# Patient Record
Sex: MALE | Race: WHITE | NOT HISPANIC OR LATINO | Employment: OTHER | URBAN - METROPOLITAN AREA
[De-identification: names, ages, dates, MRNs, and addresses within clinical notes are randomized per-mention and may not be internally consistent; named-entity substitution may affect disease eponyms.]

---

## 2017-01-18 ENCOUNTER — DOCUMENTATION ONLY (OUTPATIENT)
Dept: REHABILITATION | Facility: HOSPITAL | Age: 82
End: 2017-01-18

## 2017-01-18 NOTE — PROGRESS NOTES
Name: Arie Olson Brad  Referring Provider:  PT Order: PT evaluate and treat  Clinical #: [id number]  Discharge Summary Date: 1/18/2017  Diagnosis: pain of both hip joints    Patient was seen for 13 OP PT visits from 10/13/2016 to 12/19/2016. Pt missed/no visit 0 scheduled sessions. Treatment included: evaluation, HEP, pt education, aquatic therapy, joint mobilizations, ther ex, and stretching. PT unable to fully assess goal achievement as pt did not return for follow up sessions/did not reschedule follow up visits. This patient is discharged from OP PT Services.    Yoon Bravo DPT  1/18/2017

## 2017-01-30 DIAGNOSIS — I48.0 PAF (PAROXYSMAL ATRIAL FIBRILLATION): Primary | ICD-10-CM

## 2017-01-31 ENCOUNTER — HOSPITAL ENCOUNTER (OUTPATIENT)
Dept: CARDIOLOGY | Facility: CLINIC | Age: 82
Discharge: HOME | End: 2017-01-31
Payer: MEDICARE

## 2017-01-31 ENCOUNTER — OFFICE VISIT (OUTPATIENT)
Dept: ELECTROPHYSIOLOGY | Facility: CLINIC | Age: 82
End: 2017-01-31
Payer: MEDICARE

## 2017-01-31 VITALS
HEART RATE: 63 BPM | BODY MASS INDEX: 25.75 KG/M2 | HEIGHT: 70 IN | DIASTOLIC BLOOD PRESSURE: 59 MMHG | SYSTOLIC BLOOD PRESSURE: 117 MMHG | WEIGHT: 179.88 LBS

## 2017-01-31 DIAGNOSIS — I73.9 PAD (PERIPHERAL ARTERY DISEASE): ICD-10-CM

## 2017-01-31 DIAGNOSIS — I34.0 NON-RHEUMATIC MITRAL REGURGITATION: Chronic | ICD-10-CM

## 2017-01-31 DIAGNOSIS — I48.0 PAF (PAROXYSMAL ATRIAL FIBRILLATION): ICD-10-CM

## 2017-01-31 DIAGNOSIS — I49.3 PVC'S (PREMATURE VENTRICULAR CONTRACTIONS): Chronic | ICD-10-CM

## 2017-01-31 DIAGNOSIS — Z79.899 ON AMIODARONE THERAPY: ICD-10-CM

## 2017-01-31 DIAGNOSIS — I48.0 PAROXYSMAL ATRIAL FIBRILLATION: Primary | ICD-10-CM

## 2017-01-31 DIAGNOSIS — I10 ESSENTIAL HYPERTENSION: Chronic | ICD-10-CM

## 2017-01-31 PROCEDURE — 93010 ELECTROCARDIOGRAM REPORT: CPT | Mod: S$PBB,,, | Performed by: INTERNAL MEDICINE

## 2017-01-31 PROCEDURE — 99999 PR PBB SHADOW E&M-EST. PATIENT-LVL III: CPT | Mod: PBBFAC,,, | Performed by: INTERNAL MEDICINE

## 2017-01-31 PROCEDURE — 99214 OFFICE O/P EST MOD 30 MIN: CPT | Mod: S$PBB,,, | Performed by: INTERNAL MEDICINE

## 2017-01-31 PROCEDURE — 99213 OFFICE O/P EST LOW 20 MIN: CPT | Mod: PBBFAC | Performed by: INTERNAL MEDICINE

## 2017-01-31 NOTE — MR AVS SNAPSHOT
[facility]  [address]  [facility] [address]  Phone: [phone]  Fax: [phone]                  Arie Salinas   2017 2:20 PM   Office Visit    Description:  Male : 6/15/1927   Provider:  Terence Belle MD   Department:  [facility]           Reason for Visit     Atrial Fibrillation           Diagnoses this Visit        Comments    Paroxysmal atrial fibrillation    -  Primary     On amiodarone therapy         Essential hypertension         Non-rheumatic mitral regurgitation         PAD (peripheral artery disease)         PVC's (premature ventricular contractions)                To Do List           Goals (5 Years of Data)     None      Follow-Up and Disposition     Return in about 1 year (around 2018).      [facility] On Call     [facility] On Call Nurse Care Line -  Assistance  Registered nurses in the [facility] On Call Center provide clinical advisement, health education, appointment booking, and other advisory services.  Call for this free service at [phone].             Medications           Message regarding Medications     Verify the changes and/or additions to your medication regime listed below are the same as discussed with your clinician today.  If any of these changes or additions are incorrect, please notify your healthcare provider.             Verify that the below list of medications is an accurate representation of the medications you are currently taking.  If none reported, the list may be blank. If incorrect, please contact your healthcare provider. Carry this list with you in case of emergency.           Current Medications     allopurinol (ZYLOPRIM) 300 MG tablet Take 1 tablet (300 mg total) by mouth once daily.    amiodarone (PACERONE) 200 MG Tab Take 1 tablet (200 mg total) by mouth once daily.    apixaban 2.5 mg Tab Take 1 tablet (2.5 mg total) by mouth 2 (two) times daily.    aspirin 81 mg Tab Take 81 mg by mouth Daily.  "   atorvastatin (LIPITOR) 20 MG tablet Take 1 tablet (20 mg total) by mouth once daily.    lisinopril (PRINIVIL,ZESTRIL) 20 MG tablet Take 1 tablet (20 mg total) by mouth once daily.    metoprolol succinate (TOPROL-XL) 50 MG 24 hr tablet Take 1 tablet (50 mg total) by mouth once daily.    multivitamin-minerals-lutein (CENTRUM SILVER) Tab Take 1 tablet by mouth Daily.    tamsulosin (FLOMAX) 0.4 mg Cp24 Take 1 capsule (0.4 mg total) by mouth once daily.           Clinical Reference Information           Vital Signs - Last Recorded  Most recent update: 1/31/2017  2:14 PM by Debra Quintana MA    BP Pulse Ht Wt BMI    (!) 117/59 63 5' 10" (1.778 m) 81.6 kg (179 lb 14.3 oz) 25.81 kg/m2      Blood Pressure          Most Recent Value    BP  (!)  117/59      Allergies as of 1/31/2017     Iodine      Immunizations Administered on Date of Encounter - 1/31/2017     None      Orders Placed During Today's Visit     Future Labs/Procedures Expected by Expires    TSH  1/31/2017 4/1/2018    Complete PFT with bronchodilator  As directed 1/31/2018    PULSE OXIMETRY WITH REST - PULM  As directed 1/31/2018      "

## 2017-02-09 ENCOUNTER — HOSPITAL ENCOUNTER (OUTPATIENT)
Dept: PULMONOLOGY | Facility: CLINIC | Age: 82
Discharge: HOME | End: 2017-02-09
Payer: MEDICARE

## 2017-02-09 DIAGNOSIS — Z79.899 ON AMIODARONE THERAPY: ICD-10-CM

## 2017-02-09 LAB
PRE FEV1 FVC: 65
PRE FEV1: 2.04
PRE FVC: 3.16
PREDICTED FEV1 FVC: 77
PREDICTED FEV1: 2.22
PREDICTED FVC: 2.91

## 2017-02-09 PROCEDURE — 94010 BREATHING CAPACITY TEST: CPT | Mod: 26,S$PBB,, | Performed by: INTERNAL MEDICINE

## 2017-02-09 PROCEDURE — 94010 BREATHING CAPACITY TEST: CPT | Mod: PBBFAC | Performed by: INTERNAL MEDICINE

## 2017-02-09 PROCEDURE — 94729 DIFFUSING CAPACITY: CPT | Mod: PBBFAC | Performed by: INTERNAL MEDICINE

## 2017-02-09 PROCEDURE — 94729 DIFFUSING CAPACITY: CPT | Mod: 26,S$PBB,, | Performed by: INTERNAL MEDICINE

## 2017-02-10 ENCOUNTER — LAB VISIT (OUTPATIENT)
Dept: LAB | Facility: HOSPITAL | Age: 82
End: 2017-02-10
Attending: INTERNAL MEDICINE
Payer: MEDICARE

## 2017-02-10 DIAGNOSIS — Z79.899 ON AMIODARONE THERAPY: ICD-10-CM

## 2017-02-10 LAB — TSH SERPL DL<=0.005 MIU/L-ACNC: 0.98 UIU/ML

## 2017-02-10 PROCEDURE — 84443 ASSAY THYROID STIM HORMONE: CPT

## 2017-02-10 PROCEDURE — 36415 COLL VENOUS BLD VENIPUNCTURE: CPT | Mod: PO

## 2017-02-20 ENCOUNTER — TELEPHONE (OUTPATIENT)
Dept: GASTROENTEROLOGY | Facility: CLINIC | Age: 82
End: 2017-02-20

## 2017-02-20 NOTE — TELEPHONE ENCOUNTER
----- Message from Miguel Angel Jo MD sent at 2/20/2017  3:20 PM CST -----  Contact: [phone] -self   Either next Monday at 2 pm as new pt, or cancellation later  ----- Message -----     From: Faviola Reeves MA     Sent: 2/20/2017   2:49 PM       To: Miguel Angel Jo MD    Stated he has seen you in the past.  Offered him the 20th and he feels it is too far off.  Stated his son daniel blake, who is a MD, referred him.  Do you want to work him in sooner?  ----- Message -----     From: Socorro Opal     Sent: 2/20/2017   2:19 PM       To: Sandro Jo - wants appt - stomach is bothering him - please call patient at [phone]

## 2017-02-22 ENCOUNTER — HOSPITAL ENCOUNTER (EMERGENCY)
Facility: HOSPITAL | Age: 82
Discharge: HOME | End: 2017-02-22
Attending: EMERGENCY MEDICINE
Payer: MEDICARE

## 2017-02-22 VITALS
HEART RATE: 63 BPM | RESPIRATION RATE: 20 BRPM | WEIGHT: 165 LBS | DIASTOLIC BLOOD PRESSURE: 71 MMHG | OXYGEN SATURATION: 98 % | SYSTOLIC BLOOD PRESSURE: 156 MMHG | TEMPERATURE: 98 F | HEIGHT: 68 IN | BODY MASS INDEX: 25.01 KG/M2

## 2017-02-22 DIAGNOSIS — R07.9 CHEST PAIN, UNSPECIFIED TYPE: Primary | ICD-10-CM

## 2017-02-22 LAB
ALBUMIN SERPL BCP-MCNC: 3.3 G/DL
ALP SERPL-CCNC: 101 U/L
ALT SERPL W/O P-5'-P-CCNC: 24 U/L
ANION GAP SERPL CALC-SCNC: 9 MMOL/L
AST SERPL-CCNC: 24 U/L
BASOPHILS # BLD AUTO: 0.01 K/UL
BASOPHILS NFR BLD: 0.1 %
BILIRUB SERPL-MCNC: 0.6 MG/DL
BNP SERPL-MCNC: 503 PG/ML
BUN SERPL-MCNC: 18 MG/DL
CALCIUM SERPL-MCNC: 9.1 MG/DL
CHLORIDE SERPL-SCNC: 108 MMOL/L
CO2 SERPL-SCNC: 23 MMOL/L
CREAT SERPL-MCNC: 1.4 MG/DL
DIFFERENTIAL METHOD: ABNORMAL
EOSINOPHIL # BLD AUTO: 0.1 K/UL
EOSINOPHIL NFR BLD: 0.9 %
ERYTHROCYTE [DISTWIDTH] IN BLOOD BY AUTOMATED COUNT: 14.1 %
EST. GFR  (AFRICAN AMERICAN): 51.1 ML/MIN/1.73 M^2
EST. GFR  (NON AFRICAN AMERICAN): 44.2 ML/MIN/1.73 M^2
GLUCOSE SERPL-MCNC: 88 MG/DL
HCT VFR BLD AUTO: 38.5 %
HGB BLD-MCNC: 12.7 G/DL
INR PPP: 1
LYMPHOCYTES # BLD AUTO: 1.2 K/UL
LYMPHOCYTES NFR BLD: 13.6 %
MCH RBC QN AUTO: 30.5 PG
MCHC RBC AUTO-ENTMCNC: 33 %
MCV RBC AUTO: 93 FL
MONOCYTES # BLD AUTO: 0.9 K/UL
MONOCYTES NFR BLD: 10.7 %
NEUTROPHILS # BLD AUTO: 6.3 K/UL
NEUTROPHILS NFR BLD: 74.1 %
PLATELET # BLD AUTO: 203 K/UL
PMV BLD AUTO: 11.3 FL
POTASSIUM SERPL-SCNC: 4.7 MMOL/L
PROT SERPL-MCNC: 6.4 G/DL
PROTHROMBIN TIME: 11.1 SEC
RBC # BLD AUTO: 4.16 M/UL
SODIUM SERPL-SCNC: 140 MMOL/L
TROPONIN I SERPL DL<=0.01 NG/ML-MCNC: 0.01 NG/ML
WBC # BLD AUTO: 8.54 K/UL

## 2017-02-22 PROCEDURE — 85610 PROTHROMBIN TIME: CPT

## 2017-02-22 PROCEDURE — 93010 ELECTROCARDIOGRAM REPORT: CPT | Mod: ,,, | Performed by: INTERNAL MEDICINE

## 2017-02-22 PROCEDURE — 93005 ELECTROCARDIOGRAM TRACING: CPT

## 2017-02-22 PROCEDURE — 99285 EMERGENCY DEPT VISIT HI MDM: CPT | Mod: ,,, | Performed by: EMERGENCY MEDICINE

## 2017-02-22 PROCEDURE — 25000003 PHARM REV CODE 250: Performed by: EMERGENCY MEDICINE

## 2017-02-22 PROCEDURE — 80053 COMPREHEN METABOLIC PANEL: CPT

## 2017-02-22 PROCEDURE — 83880 ASSAY OF NATRIURETIC PEPTIDE: CPT

## 2017-02-22 PROCEDURE — 99284 EMERGENCY DEPT VISIT MOD MDM: CPT | Mod: 25

## 2017-02-22 PROCEDURE — 85025 COMPLETE CBC W/AUTO DIFF WBC: CPT

## 2017-02-22 PROCEDURE — 84484 ASSAY OF TROPONIN QUANT: CPT

## 2017-02-22 RX ORDER — ASPIRIN 325 MG
325 TABLET ORAL
Status: COMPLETED | OUTPATIENT
Start: 2017-02-22 | End: 2017-02-22

## 2017-02-22 RX ADMIN — ASPIRIN 325 MG ORAL TABLET 325 MG: 325 PILL ORAL at 04:02

## 2017-02-22 NOTE — DISCHARGE INSTRUCTIONS
Follow up with your PCP and cardiology.  Return to ED for chest pain, shortness of breath, lightheadedness, dizziness or any other concerns.    Uncertain Causes of Chest Pain    Chest pain can happen for a number of reasons. Sometimes the cause can't be determined. If your condition does not seem serious, and your pain does not appear to be coming from your heart, your healthcare provider may recommend watching it closely. Sometimes the signs of a serious problem take more time to appear. Many problems not related to your heart can cause chest pain.These include:  · Musculoskeletal. Costochondritis, an inflammation of the tissues around the ribs that can occur from trauma or overuse injuries  · Respiratory. Pneumonia, pneumothorax, or pneumonitis (inflammation of the lining of the chest and lungs)  · Gastrointestinal. Esophageal reflux, heartburn, or gallbladder disease  · Anxiety and panic disorders  · Nerve compression and neuritis  · Miscellaneous problems such as aortic aneurysm or pulmonary embolism (a blood clot in the lungs)  Home care  After your visit, follow these recommendations:  · Rest today and avoid strenuous activity.  · Take any prescribed medicine as directed.  · Be aware of any recurrent chest pain and notice any changes  Follow-up care  Follow up with your healthcare provider if you do not start to feel better within 24 hours, or as advised.  Call 911  Call 911 if any of these occur:  · A change in the type of pain: if it feels different, becomes more severe, lasts longer, or begins to spread into your shoulder, arm, neck, jaw or back  · Shortness of breath or increased pain with breathing  · Weakness, dizziness, or fainting  · Rapid heart beat  · Crushing sensation in your chest  When to seek medical advice  Call your healthcare provider right away if any of the following occur:  · Cough with dark colored sputum (phlegm) or blood  · Fever of 100.4ºF (38ºC) or higher, or as directed by your  healthcare provider  · Swelling, pain or redness in one leg  · Shortness of breath  Date Last Reviewed: 12/30/2015  © 6591-6370 [employer]. [address] [place] [address], [place] [address]. All rights reserved. This information is not intended as a substitute for professional medical care. Always follow your healthcare professional's instructions.

## 2017-02-22 NOTE — ED AVS SNAPSHOT
[facility]  [address]  [facility] [address]               Arie Salinas   2017  2:16 PM   ED    Description:  Male : 6/15/1927   Department:  [facility]           Your Care was Coordinated By:     Provider Role From To    Caterina Duvall MD Attending Provider 17 1419 --      Reason for Visit     Chest Pain           Diagnoses this Visit        Comments    Chest pain, unspecified type    -  Primary       ED Disposition     ED Disposition Condition Comment    Discharge             To Do List           Follow-up Information     Follow up with Collin Mendes Jr, MD. Go in 1 week.    Specialty:  Internal Medicine    Contact information:    [address]  [facility] [address]  [phone]          Follow up with Kerri Gonzalez MD. Schedule an appointment as soon as possible for a visit in 1 week.    Specialty:  Cardiology    Contact information:     [facility]  [address]TH FLOOR  [facility] [address]  [phone]        [facility] On Call     [facility] On Call Nurse Care Line -  Assistance  Registered nurses in the [facility] On Call Center provide clinical advisement, health education, appointment booking, and other advisory services.  Call for this free service at [phone].             Medications           Message regarding Medications     Verify the changes and/or additions to your medication regime listed below are the same as discussed with your clinician today.  If any of these changes or additions are incorrect, please notify your healthcare provider.        These medications were administered today        Dose Freq    aspirin tablet 325 mg 325 mg ED 1 Time    Sig: Take 1 tablet (325 mg total) by mouth ED 1 Time.    Class: Normal    Route: Oral           Verify that the below list of medications is an accurate representation of the medications you are currently taking.  If none reported, the list may be  "blank. If incorrect, please contact your healthcare provider. Carry this list with you in case of emergency.           Current Medications     allopurinol (ZYLOPRIM) 300 MG tablet Take 1 tablet (300 mg total) by mouth once daily.    amiodarone (PACERONE) 200 MG Tab Take 1 tablet (200 mg total) by mouth once daily.    apixaban 2.5 mg Tab Take 1 tablet (2.5 mg total) by mouth 2 (two) times daily.    aspirin 81 mg Tab Take 81 mg by mouth Daily.    atorvastatin (LIPITOR) 20 MG tablet Take 1 tablet (20 mg total) by mouth once daily.    lisinopril (PRINIVIL,ZESTRIL) 20 MG tablet Take 1 tablet (20 mg total) by mouth once daily.    metoprolol succinate (TOPROL-XL) 50 MG 24 hr tablet Take 1 tablet (50 mg total) by mouth once daily.    multivitamin-minerals-lutein (CENTRUM SILVER) Tab Take 1 tablet by mouth Daily.    tamsulosin (FLOMAX) 0.4 mg Cp24 Take 1 capsule (0.4 mg total) by mouth once daily.           Clinical Reference Information           Your Vitals Were     BP Pulse Temp Resp Height Weight    156/71 (BP Location: Right arm, Patient Position: Lying, BP Method: Automatic) 63 97.8 °F (36.6 °C) (Oral) 20 5' 8" (1.727 m) 74.8 kg (165 lb)    SpO2 BMI             98% 25.09 kg/m2         Allergies as of 2/22/2017        Reactions    Iodine     Other reaction(s): Unknown      Immunizations Administered on Date of Encounter - 2/22/2017     None      ED Micro, Lab, POCT     Start Ordered       Status Ordering Provider    02/22/17 1443 02/22/17 1505  CBC auto differential  STAT      Final result     02/22/17 1443 02/22/17 1505  Comprehensive metabolic panel  STAT      Final result     02/22/17 1443 02/22/17 1505  Protime-INR  STAT      Final result     02/22/17 1443 02/22/17 1505  Troponin I  Now then every 3 hours     Comments:  PLEASE REVIEW ORDER START TIME BEFORE MARKING SPECIMEN COLLECTED.   Start Status   02/22/17 1443 Final result   02/22/17 1743 Acknowledged       Acknowledged     02/22/17 1443 02/22/17 1505  B-Type " natriuretic peptide (BNP)  STAT      Final result       ED Imaging Orders     Start Ordered       Status Ordering Provider    02/22/17 1443 02/22/17 1505  X-Ray Chest PA And Lateral  1 time imaging      Final result         Discharge Instructions       Follow up with your PCP and cardiology.  Return to ED for chest pain, shortness of breath, lightheadedness, dizziness or any other concerns.    Uncertain Causes of Chest Pain    Chest pain can happen for a number of reasons. Sometimes the cause can't be determined. If your condition does not seem serious, and your pain does not appear to be coming from your heart, your healthcare provider may recommend watching it closely. Sometimes the signs of a serious problem take more time to appear. Many problems not related to your heart can cause chest pain.These include:  · Musculoskeletal. Costochondritis, an inflammation of the tissues around the ribs that can occur from trauma or overuse injuries  · Respiratory. Pneumonia, pneumothorax, or pneumonitis (inflammation of the lining of the chest and lungs)  · Gastrointestinal. Esophageal reflux, heartburn, or gallbladder disease  · Anxiety and panic disorders  · Nerve compression and neuritis  · Miscellaneous problems such as aortic aneurysm or pulmonary embolism (a blood clot in the lungs)  Home care  After your visit, follow these recommendations:  · Rest today and avoid strenuous activity.  · Take any prescribed medicine as directed.  · Be aware of any recurrent chest pain and notice any changes  Follow-up care  Follow up with your healthcare provider if you do not start to feel better within 24 hours, or as advised.  Call 911  Call 911 if any of these occur:  · A change in the type of pain: if it feels different, becomes more severe, lasts longer, or begins to spread into your shoulder, arm, neck, jaw or back  · Shortness of breath or increased pain with breathing  · Weakness, dizziness, or fainting  · Rapid heart  beat  · Crushing sensation in your chest  When to seek medical advice  Call your healthcare provider right away if any of the following occur:  · Cough with dark colored sputum (phlegm) or blood  · Fever of 100.4ºF (38ºC) or higher, or as directed by your healthcare provider  · Swelling, pain or redness in one leg  · Shortness of breath  Date Last Reviewed: 12/30/2015  © 1404-5842 [employer]. [address] [place] [address], [place] [address]. All rights reserved. This information is not intended as a substitute for professional medical care. Always follow your healthcare professional's instructions.          Your Scheduled Appointments     Feb 27, 2017  2:00 PM CST   New Patient with Miguel Angel Jo MD   [facility] - Gastroenterology ([facility] )    [address]  [place] [address] [phone]            Mar 21, 2017  1:00 PM CDT   Non-Fasting Lab with LAB, APPOINTMENT [place] [facility] ([facility])    [address] [facility] [address] [phone]            Mar 29, 2017  2:20 PM CDT   Established Patient Visit with Christelle Culp MD   [facility] - Nephrology ([facility] )    [address]  [place] [address] [phone]              Smoking Cessation     If you would like to quit smoking:   You may be eligible for free services if you are a [place] resident and started smoking cigarettes before September 1, 1988.  Call the Smoking Cessation Trust (SCT) toll free at (224) 582-5365 or (701) 048-0870.   Call 6-374-QUIT-NOW if you do not meet the above criteria.             [facility] complies with applicable Federal civil rights laws and does not discriminate on the basis of race, color, national origin, age, disability, or sex.        Language Assistance Services     ATTENTION: Language assistance services are available, free of charge. Please call [phone].      ATENCIÓN: Valeria echols  español, tiene a umanzor disposición servicios gratuitos de asistencia lingüística. Llame al [phone].     ANKIT Ý: N?u b?n nói Ti?ng Vi?t, có các d?ch v? h? tr? ngôn ng? mi?n phí dành cho b?n. G?i s? [phone].

## 2017-02-22 NOTE — ED NOTES
"Pt states his chest pain is "actually a little better".  Pt advised that he will be called as soon as possible.  "

## 2017-02-22 NOTE — PROVIDER PROGRESS NOTES - EMERGENCY DEPT.
Encounter Date: 2/22/2017    ED Physician Progress Notes         EKG - STEMI Decision  Initial Reading: No STEMI present.  Response: No Action Needed.

## 2017-02-22 NOTE — ED PROVIDER NOTES
"Encounter Date: 2/22/2017    SCRIBE #1 NOTE: I, Yazmin Marie, am scribing for, and in the presence of, Dr. Duvall.       History     Chief Complaint   Patient presents with    Chest Pain     Review of patient's allergies indicates:   Allergen Reactions    Iodine      Other reaction(s): Unknown     HPI Comments: Time seen by provider: 2:22 PM    This is a 89 y.o. male with hypertension, CAD, chronic diastolic heart failure, and atrial fibrillation who presents with complaint of intermittent "twitching" in his chest since this morning.  The patient indicates that the discomfort is not painful and that he was at baseline when he went to sleep last night.  He denies associated SOB, pain with inspiration, light-headedness, dizziness, nausea, and vomiting.   He reports associated left flank pain and lower right and left quadrant abdominal pain.  The patient indicates that he takes Eloquis daily and that he woke up at 6:00 AM but the twitching did not begin until approximately 8:00 AM this morning.    The history is provided by the patient.     Past Medical History:   Diagnosis Date    Allergy     Arthritis     Atrial fibrillation 12/4/2015    Basal cell carcinoma     Chronic diastolic heart failure     Colon polyp     Coronary artery disease     Elevated PSA     Hyperlipidemia     Hypertension     Moderate mitral regurgitation     Prostate cancer     PVD (peripheral vascular disease)     Squamous cell carcinoma      Past Medical History Pertinent Negatives:   Diagnosis Date Noted    AAA (abdominal aortic aneurysm) 05/13/2015    Acute coronary syndrome 05/13/2015    Asthma 05/13/2015    Atrial flutter 05/13/2015    Cardiomyopathy 05/13/2015    Carotid artery occlusion 05/13/2015    CHF (congestive heart failure) 05/13/2015    Clotting disorder 05/13/2015    Diabetes mellitus 05/13/2015    Heart block 05/13/2015    Heart murmur 05/13/2015    Melanoma 08/29/2013    Sleep apnea 05/13/2015    " 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    Stroke 05/13/2015    Supraventricular tachycardia 05/13/2015    Syncope and collapse 05/13/2015    Thyroid disease 05/13/2015    Valvular regurgitation 05/13/2015    Ventricular tachycardia 05/13/2015     Past Surgical History:   Procedure Laterality Date    COLON SURGERY      radiation      prostate cancer    S/P BILATERAL RENAL STENT  04/08/2004    PTCA    TONSILLECTOMY       Family History   Problem Relation Age of Onset    Cancer Mother     No Known Problems Father     No Known Problems Sister     No Known Problems Maternal Grandmother     No Known Problems Maternal Grandfather     No Known Problems Paternal Grandmother     No Known Problems Paternal Grandfather     No Known Problems Brother     No Known Problems Maternal Aunt     No Known Problems Maternal Uncle     No Known Problems Paternal Aunt     No Known Problems Paternal Uncle     Heart disease Neg Hx     Diabetes Neg Hx     Colon polyps Neg Hx     Melanoma Neg Hx     Eczema Neg Hx     Psoriasis Neg Hx     Anemia Neg Hx     Arrhythmia Neg Hx     Asthma Neg Hx     Clotting disorder Neg Hx     Fainting Neg Hx     Heart attack Neg Hx     Heart failure Neg Hx     Hyperlipidemia Neg Hx     Hypertension Neg Hx     Stroke Neg Hx     Atrial Septal Defect Neg Hx      Social History   Substance Use Topics    Smoking status: Former Smoker     Years: 30.00     Types: Cigars     Quit date: 1/1/1998    Smokeless tobacco: Never Used    Alcohol use 3.6 oz/week     1 Shots of liquor, 5 Standard drinks or equivalent per week      Comment: 1 a day     Review of Systems   Constitutional: Negative for fever.   HENT: Negative for sore throat.    Eyes: Negative for visual disturbance.   Respiratory: Negative for shortness of breath.         The patient denies pain on inspiration.   Cardiovascular:        The patient indicates chest twitching.   Gastrointestinal: Positive for abdominal  pain (Lower right and left quadrant abdominal pain.). Negative for nausea and vomiting.   Genitourinary: Positive for flank pain (Left flank pain.).   Musculoskeletal: Negative for back pain.   Skin: Negative for pallor.   Neurological: Negative for dizziness and light-headedness.       Physical Exam   Initial Vitals   BP Pulse Resp Temp SpO2   02/22/17 1127 02/22/17 1127 02/22/17 1127 02/22/17 1127 02/22/17 1127   156/119 63 20 98.9 °F (37.2 °C) 95 %     Physical Exam    Nursing note and vitals reviewed.  Constitutional: He appears well-developed and well-nourished. No distress.   HENT:   Head: Normocephalic and atraumatic.   Eyes: EOM are normal. Pupils are equal, round, and reactive to light.   Neck: Normal range of motion.   Cardiovascular:   Murmur (Diastolic) heard.  Pulmonary/Chest: Breath sounds normal. No respiratory distress. He has no wheezes. He has no rhonchi. He has no rales.   Abdominal: Soft. He exhibits no distension. There is no tenderness. There is no rebound and no guarding.   Musculoskeletal: He exhibits no edema.   Neurological: He is alert and oriented to person, place, and time. He has normal strength.         ED Course   Procedures  Labs Reviewed   CBC W/ AUTO DIFFERENTIAL - Abnormal; Notable for the following:        Result Value    RBC 4.16 (*)     Hemoglobin 12.7 (*)     Hematocrit 38.5 (*)     Gran% 74.1 (*)     Lymph% 13.6 (*)     All other components within normal limits    Narrative:     PLEASE REVIEW ORDER START TIME BEFORE MARKING SPECIMEN  COLLECTED.   COMPREHENSIVE METABOLIC PANEL - Abnormal; Notable for the following:     Albumin 3.3 (*)     eGFR if  51.1 (*)     eGFR if non  44.2 (*)     All other components within normal limits    Narrative:     PLEASE REVIEW ORDER START TIME BEFORE MARKING SPECIMEN  COLLECTED.   B-TYPE NATRIURETIC PEPTIDE - Abnormal; Notable for the following:      (*)     All other components within normal limits     Narrative:     PLEASE REVIEW ORDER START TIME BEFORE MARKING SPECIMEN  COLLECTED.   PROTIME-INR    Narrative:     PLEASE REVIEW ORDER START TIME BEFORE MARKING SPECIMEN  COLLECTED.   TROPONIN I    Narrative:     PLEASE REVIEW ORDER START TIME BEFORE MARKING SPECIMEN  COLLECTED.     EKG Readings: (Independently Interpreted)   Sinus rhythm 1st deg AV block, no acute ischemia       X-Rays:   Independently Interpreted Readings:   Chest X-Ray: Mild blunting Lt costophrenic angle otherwise unremarkable     Medical Decision Making:   History:   Old Medical Records: I decided to obtain old medical records.  Initial Assessment:   88 y/o M c/o 'twitching' in Lt chest since 8am this morning no other associated complaints  ulikely ACS but given age and history will send blood work and order cxr  Clinical Tests:   Medical Tests: Ordered and Reviewed            Scribe Attestation:   Scribe #1: I performed the above scribed service and the documentation accurately describes the services I performed. I attest to the accuracy of the note.    Attending Attestation:           Physician Attestation for Scribe:  Physician Attestation Statement for Scribe #1: I, Dr. Duvall, reviewed documentation, as scribed by Yazmin Marie in my presence, and it is both accurate and complete.                 ED Course     Clinical Impression:   The encounter diagnosis was Chest pain, unspecified type.    Disposition:   Disposition: Discharged  Condition: Stable       Caterina Duvall MD  03/24/17 1748

## 2017-02-22 NOTE — ED NOTES
Pt identifiers Arie Salinas were checked and are correct  LOC: The patient is awake, alert, aware of environment with an appropriate affect. Oriented x3, speaking appropriately  APPEARANCE: Pt resting comfortably, in no acute distress, pt is clean and well groomed, clothing properly fastened  SKIN: Skin warm, dry and intact, normal skin turgor, moist mucus membranes  RESPIRATORY: Airway is open and patent, respirations are spontaneous, even and unlabored, normal effort and rate Resp full and reg Breath sounds clear cathryn to all lung fields on auscultation  CARDIAC: Normal rate and rhythm, 1 + peripheral edema noted, capillary refill < 3 seconds, bilateral radial pulses 2+  ABDOMEN: Soft, nontender, nondistended. Bowel sounds present   NEUROLOGIC:  facial expression is symmetrical, patient moving all extremities spontaneously, normal sensation in all extremities when touched with a finger.  Follows all commands appropriately  MUSCULOSKELETAL: No obvious deformities.

## 2017-02-27 ENCOUNTER — OFFICE VISIT (OUTPATIENT)
Dept: GASTROENTEROLOGY | Facility: CLINIC | Age: 82
End: 2017-02-27
Payer: MEDICARE

## 2017-02-27 VITALS
HEIGHT: 69 IN | DIASTOLIC BLOOD PRESSURE: 63 MMHG | WEIGHT: 177 LBS | RESPIRATION RATE: 16 BRPM | SYSTOLIC BLOOD PRESSURE: 132 MMHG | HEART RATE: 61 BPM | BODY MASS INDEX: 26.22 KG/M2

## 2017-02-27 DIAGNOSIS — R14.0 ABDOMINAL DISTENSION: Primary | ICD-10-CM

## 2017-02-27 PROCEDURE — 99999 PR PBB SHADOW E&M-EST. PATIENT-LVL III: CPT | Mod: PBBFAC,,, | Performed by: INTERNAL MEDICINE

## 2017-02-27 PROCEDURE — 99205 OFFICE O/P NEW HI 60 MIN: CPT | Mod: S$PBB,,, | Performed by: INTERNAL MEDICINE

## 2017-02-27 PROCEDURE — 99213 OFFICE O/P EST LOW 20 MIN: CPT | Mod: PBBFAC | Performed by: INTERNAL MEDICINE

## 2017-02-27 NOTE — PROGRESS NOTES
Subjective:       Patient ID: Arie Salinas is a 89 y.o. male.    Chief Complaint: stomach tightness    HPI  Review of Systems   Constitutional: Positive for activity change. Negative for appetite change, chills, diaphoresis, fatigue, fever and unexpected weight change.   HENT: Negative for congestion, ear pain, mouth sores, rhinorrhea, sinus pressure, sneezing, sore throat, trouble swallowing and voice change.    Eyes: Negative for pain.   Respiratory: Positive for shortness of breath. Negative for cough.    Cardiovascular: Positive for chest pain. Negative for palpitations and leg swelling.   Genitourinary: Negative for difficulty urinating and flank pain.   Musculoskeletal: Positive for arthralgias, back pain and gait problem. Negative for joint swelling, myalgias and neck pain.   Skin: Negative for rash.   Neurological: Negative for dizziness, tremors, syncope, numbness and headaches.   Hematological: Negative for adenopathy. Does not bruise/bleed easily.   Psychiatric/Behavioral: Negative for agitation, behavioral problems, confusion, decreased concentration and dysphoric mood.       Objective:      Physical Exam   Constitutional: He is oriented to person, place, and time. He appears well-developed and well-nourished. No distress.   HENT:   Right Ear: External ear normal.   Left Ear: External ear normal.   Nose: Nose normal.   Mouth/Throat: Oropharynx is clear and moist. No oropharyngeal exudate.   Eyes: Conjunctivae are normal. Pupils are equal, round, and reactive to light. No scleral icterus.   Neck: Normal range of motion. Neck supple. No thyromegaly present.   Cardiovascular: Normal rate and intact distal pulses.  Exam reveals no gallop.    Murmur heard.  Pulmonary/Chest: Effort normal and breath sounds normal. He has no wheezes.   Abdominal: Soft. Normal appearance. He exhibits abdominal bruit and ascites. He exhibits no distension, no fluid wave and no mass. Bowel sounds are increased. There is  no hepatosplenomegaly. There is no tenderness. There is no rigidity, no rebound, no guarding and no CVA tenderness.   Genitourinary:   Genitourinary Comments: recent   Musculoskeletal: Normal range of motion. He exhibits no edema or tenderness.   Lymphadenopathy:     He has no cervical adenopathy.   Neurological: He is alert and oriented to person, place, and time. He has normal reflexes. No cranial nerve deficit.   Skin: Skin is warm. No rash noted. He is not diaphoretic.   Psychiatric: He has a normal mood and affect. His behavior is normal. Thought content normal.       Assessment:       1. Abdominal distension        Plan:         This is a new patient.  Mr. Salinas is here with his ____.  He is an 89-year-old   gentleman with multiple severe medical problems including atrial fibrillation,   mitral regurgitation, peripheral artery disease, coronary artery disease,   anticoagulation, renal insufficiency.  He comes in today because of abdominal   discomfort.  For several months, he has noticed a generalized discomfort and   distention of the abdomen.  This does not localize.  He notices it every day.    It tends to be noticed more in the later aspects of the day, it does not get   worse after eating.  It is an annoyance rather than a pain.  His appetite is   normal.  He denies any early satiety.  There is no heartburn or reflux.  His   bowel movements are normal.  He normally has one or two formed stools per day.    His GI history is remarkable only in that many years ago he had some type of   cecal lesion that Dr. Saroj Ribera did a ileocecal resection for a primary   anastomosis.  He does not know or was never told that was a cancer.  I did his   last colonoscopy in 2010, which showed a single 4 mm polyp and diverticulosis.    ASSESSMENT AND DECISION MAKING:  Abdominal distention, although there is no   pain, since this is a new symptom in an 89-year-old with multiple severe medical   problems, I think it  deserves an evaluation.  He does have an abdominal bruit   on exam, which could indicate mesenteric ischemia, but I cannot imagine that is   involved in his symptoms now.  On physical exam, it does seem like he may have   some ascites and ascites could be due to heart failure, but it also could   indicate something else serious like a malignancy.  So I feel like we need to   determine whether or not he does have ascites and if so probably due   Interventional Radiology to do a paracentesis.  Also, on exam, there seems to be   an increased in his bowel sounds and that is very nonspecific, but that could   indicate some sort of partial bowel obstruction.  He does have a significant   colon history, I did not wanted to repeat a colonoscopy on him because of his   advanced age, other medical problems, and increased risk from being on Eliquis.    However, if the CAT scan identifies any abnormality of the colon, then we may   have to take that increased risk.  Likewise, the same as for doing an EGD.  I   would prefer not to do that, but if the CAT scan identifies a problem with the   gastric walls being thick, and possibly indicating some sort of malignancy, then   we might have to do further workup.    RECOMMENDATION:  1.  Set up CT scan.  2.  Take from there.  I did notice he is anemic, but that is a stable anemia and   probably due to his chronic renal insufficiency as his iron studies are normal   and do not indicate iron deficiency.      BERNARD/IN  dd: 02/27/2017 14:37:05 (CST)  td: 02/28/2017 06:23:46 (CST)  Doc ID   #[id number]  Job ID #[id number]    CC:

## 2017-02-27 NOTE — MR AVS SNAPSHOT
[facility]  [address]  [place] [address]  Phone: [phone]  Fax: [phone]                  Arie Salinas   2017 2:00 PM   Office Visit    Description:  Male : 6/15/1927   Provider:  Miguel Angel Jo MD   Department:  [facility]           Reason for Visit     stomach tightness           Diagnoses this Visit        Comments    Abdominal distension    -  Primary            To Do List           Future Appointments        Provider Department Dept Phone    3/2/2017 11:00 AM LAB, [place] [facility] [phone]    3/11/2017 12:00 PM [facility] CT1 64- LIMIT [address] [facility] [phone]    3/21/2017 1:00 PM LAB, APPOINTMENT [place] [facility] [phone]    3/29/2017 2:20 PM Christelle Culp MD [facility] [phone]      Goals (5 Years of Data)     None      [facility]     [facility] Nurse Care Line -  Assistance  Registered nurses in the [facility] provide clinical advisement, health education, appointment booking, and other advisory services.  Call for this free service at [phone].             Medications           Message regarding Medications     Verify the changes and/or additions to your medication regime listed below are the same as discussed with your clinician today.  If any of these changes or additions are incorrect, please notify your healthcare provider.             Verify that the below list of medications is an accurate representation of the medications you are currently taking.  If none reported, the list may be blank. If incorrect, please contact your healthcare provider. Carry this list with you in case of emergency.           Current Medications     allopurinol (ZYLOPRIM) 300 MG tablet Take 1 tablet (300 mg total) by mouth once daily.    amiodarone (PACERONE) 200 MG Tab Take 1 tablet (200 mg total) by mouth once daily.     apixaban 2.5 mg Tab Take 1 tablet (2.5 mg total) by mouth 2 (two) times daily.    aspirin 81 mg Tab Take 81 mg by mouth Daily.    atorvastatin (LIPITOR) 20 MG tablet Take 1 tablet (20 mg total) by mouth once daily.    lisinopril (PRINIVIL,ZESTRIL) 20 MG tablet Take 1 tablet (20 mg total) by mouth once daily.    metoprolol succinate (TOPROL-XL) 50 MG 24 hr tablet Take 1 tablet (50 mg total) by mouth once daily.    multivitamin-minerals-lutein (CENTRUM SILVER) Tab Take 1 tablet by mouth Daily.    tamsulosin (FLOMAX) 0.4 mg Cp24 Take 1 capsule (0.4 mg total) by mouth once daily.           Clinical Reference Information           Your Vitals Were     BP                   132/63 (BP Location: Left arm, Patient Position: Sitting)           Blood Pressure          Most Recent Value    BP  132/63      Allergies as of 2/27/2017     Iodine      Immunizations Administered on Date of Encounter - 2/27/2017     None      Orders Placed During Today's Visit     Future Labs/Procedures Expected by Expires    Creatinine, serum  2/27/2017 4/28/2018    CT Abdomen Pelvis With Contrast  2/27/2017 2/27/2018      Language Assistance Services     ATTENTION: Language assistance services are available, free of charge. Please call [phone].      ATENCIÓN: Si kinza curtis, tiene a umanzor disposición servicios gratuitos de asistencia lingüística. Llame al [phone].     ANKIT Ý: N?u b?n nói Ti?ng Vi?t, có các d?ch v? h? tr? ngôn ng? mi?n phí dành cho b?n. G?i s? [phone].         [facility] complies with applicable Federal civil rights laws and does not discriminate on the basis of race, color, national origin, age, disability, or sex.

## 2017-03-01 ENCOUNTER — LAB VISIT (OUTPATIENT)
Dept: LAB | Facility: HOSPITAL | Age: 82
End: 2017-03-01
Attending: INTERNAL MEDICINE
Payer: MEDICARE

## 2017-03-01 DIAGNOSIS — R14.0 ABDOMINAL DISTENSION: ICD-10-CM

## 2017-03-01 LAB
CREAT SERPL-MCNC: 1.6 MG/DL
EST. GFR  (AFRICAN AMERICAN): 43.5 ML/MIN/1.73 M^2
EST. GFR  (NON AFRICAN AMERICAN): 37.6 ML/MIN/1.73 M^2

## 2017-03-01 PROCEDURE — 36415 COLL VENOUS BLD VENIPUNCTURE: CPT | Mod: PO

## 2017-03-01 PROCEDURE — 82565 ASSAY OF CREATININE: CPT

## 2017-03-03 ENCOUNTER — TELEPHONE (OUTPATIENT)
Dept: GASTROENTEROLOGY | Facility: CLINIC | Age: 82
End: 2017-03-03

## 2017-03-03 NOTE — TELEPHONE ENCOUNTER
Spoke with  regarding his Prednisone prep for his CT scheduled for 3/11/2017.   Aware he will have to take 50 mg of Prednisone 13 hours, 7 hours, and 1 hour prior to CT.  In addition, he will take Benadryl 50 mg 1 hour prior to CT.  Will mail prep instructions to patient.   expressed understanding.  Both Prednisone 50 mg, # 3, with sig and Benadryl 50 mg, # 1,with sig, were called in to [facility] at [phone].  Message was left on their recorded line.

## 2017-03-06 ENCOUNTER — OFFICE VISIT (OUTPATIENT)
Dept: CARDIOLOGY | Facility: CLINIC | Age: 82
End: 2017-03-06
Payer: MEDICARE

## 2017-03-06 VITALS
HEIGHT: 68 IN | BODY MASS INDEX: 27.13 KG/M2 | SYSTOLIC BLOOD PRESSURE: 119 MMHG | WEIGHT: 179 LBS | HEART RATE: 55 BPM | DIASTOLIC BLOOD PRESSURE: 57 MMHG

## 2017-03-06 DIAGNOSIS — E78.00 PURE HYPERCHOLESTEROLEMIA: Chronic | ICD-10-CM

## 2017-03-06 DIAGNOSIS — I73.9 PAD (PERIPHERAL ARTERY DISEASE): ICD-10-CM

## 2017-03-06 DIAGNOSIS — N18.30 ANEMIA OF CHRONIC RENAL FAILURE, STAGE 3 (MODERATE): Chronic | ICD-10-CM

## 2017-03-06 DIAGNOSIS — I25.10 CORONARY ARTERY DISEASE INVOLVING NATIVE CORONARY ARTERY OF NATIVE HEART WITHOUT ANGINA PECTORIS: Chronic | ICD-10-CM

## 2017-03-06 DIAGNOSIS — D63.1 ANEMIA OF CHRONIC RENAL FAILURE, STAGE 3 (MODERATE): Chronic | ICD-10-CM

## 2017-03-06 DIAGNOSIS — Z79.899 ON AMIODARONE THERAPY: ICD-10-CM

## 2017-03-06 DIAGNOSIS — I50.33 ACUTE ON CHRONIC DIASTOLIC HEART FAILURE: Primary | ICD-10-CM

## 2017-03-06 DIAGNOSIS — I34.0 NON-RHEUMATIC MITRAL REGURGITATION: Chronic | ICD-10-CM

## 2017-03-06 DIAGNOSIS — I49.3 PVC'S (PREMATURE VENTRICULAR CONTRACTIONS): Chronic | ICD-10-CM

## 2017-03-06 DIAGNOSIS — N18.30 CKD (CHRONIC KIDNEY DISEASE) STAGE 3, GFR 30-59 ML/MIN: Chronic | ICD-10-CM

## 2017-03-06 DIAGNOSIS — I10 ESSENTIAL HYPERTENSION: Chronic | ICD-10-CM

## 2017-03-06 DIAGNOSIS — I48.0 PAROXYSMAL ATRIAL FIBRILLATION: ICD-10-CM

## 2017-03-06 DIAGNOSIS — R09.89 RIGHT CAROTID BRUIT: ICD-10-CM

## 2017-03-06 DIAGNOSIS — I70.1 RENAL ARTERY STENOSIS: Chronic | ICD-10-CM

## 2017-03-06 PROCEDURE — 99213 OFFICE O/P EST LOW 20 MIN: CPT | Mod: PBBFAC,PO | Performed by: INTERNAL MEDICINE

## 2017-03-06 PROCEDURE — 99999 PR PBB SHADOW E&M-EST. PATIENT-LVL III: CPT | Mod: PBBFAC,,, | Performed by: INTERNAL MEDICINE

## 2017-03-06 PROCEDURE — 99214 OFFICE O/P EST MOD 30 MIN: CPT | Mod: S$PBB,,, | Performed by: INTERNAL MEDICINE

## 2017-03-06 RX ORDER — FUROSEMIDE 20 MG/1
20 TABLET ORAL DAILY
COMMUNITY
End: 2017-03-06 | Stop reason: SDUPTHER

## 2017-03-06 RX ORDER — FUROSEMIDE 20 MG/1
20 TABLET ORAL DAILY
Qty: 30 TABLET | Refills: 0 | Status: SHIPPED | OUTPATIENT
Start: 2017-03-06 | End: 2017-10-02

## 2017-03-06 NOTE — MR AVS SNAPSHOT
[place] [facility]   [address]  [place] [address]  Phone: [phone]                  Arie Salinas   3/6/2017 10:00 AM   Office Visit    Description:  Male : 6/15/1927   Provider:  Kerri Gonzalez MD   Department:  [place] [facility]           Reason for Visit     Hospital f/u                To Do List           Future Appointments        Provider Department Dept Phone    3/11/2017 12:00 PM [facility] CT1 64- LIMIT [address] [facility] [phone]    3/21/2017 1:00 PM LAB, APPOINTMENT [place] [facility] [phone]    3/29/2017 2:20 PM Christelle Culp MD [facility]phrology [phone]      Goals (5 Years of Data)     None      [facility] On Call     [facility] On [facility] Nurse Care Line -  Assistance  Registered nurses in the [facility] provide clinical advisement, health education, appointment booking, and other advisory services.  Call for this free service at [phone].             Medications           Message regarding Medications     Verify the changes and/or additions to your medication regime listed below are the same as discussed with your clinician today.  If any of these changes or additions are incorrect, please notify your healthcare provider.             Verify that the below list of medications is an accurate representation of the medications you are currently taking.  If none reported, the list may be blank. If incorrect, please contact your healthcare provider. Carry this list with you in case of emergency.           Current Medications     allopurinol (ZYLOPRIM) 300 MG tablet Take 1 tablet (300 mg total) by mouth once daily.    amiodarone (PACERONE) 200 MG Tab Take 1 tablet (200 mg total) by mouth once daily.    apixaban 2.5 mg Tab Take 1 tablet (2.5 mg total) by mouth 2 (two) times daily.    aspirin 81 mg Tab Take 81 mg by mouth Daily.    atorvastatin (LIPITOR) 20 MG tablet Take 1  "tablet (20 mg total) by mouth once daily.    lisinopril (PRINIVIL,ZESTRIL) 20 MG tablet Take 1 tablet (20 mg total) by mouth once daily.    metoprolol succinate (TOPROL-XL) 50 MG 24 hr tablet Take 1 tablet (50 mg total) by mouth once daily.    multivitamin-minerals-lutein (CENTRUM SILVER) Tab Take 1 tablet by mouth Daily.    tamsulosin (FLOMAX) 0.4 mg Cp24 Take 1 capsule (0.4 mg total) by mouth once daily.           Clinical Reference Information           Your Vitals Were     BP Pulse Height Weight BMI    119/57 55 5' 7.5" (1.715 m) 81.2 kg (179 lb 0.2 oz) 27.62 kg/m2      Blood Pressure          Most Recent Value    Right Arm BP - Sitting  110/54    Left Arm BP - Sitting  119/57    BP  (!)  119/57      Allergies as of 3/6/2017     Iodine      Immunizations Administered on Date of Encounter - 3/6/2017     None      Language Assistance Services     ATTENTION: Language assistance services are available, free of charge. Please call [phone].      ATENCIÓN: Si habla ever, tiene a umanzor disposición servicios gratuitos de asistencia lingüística. Llame al [phone].     ANKIT Ý: N?u b?n nói Ti?ng Vi?t, có các d?ch v? h? tr? ngôn ng? mi?n phí dành cho b?n. G?i s? [phone].         [facility] complies with applicable Federal civil rights laws and does not discriminate on the basis of race, color, national origin, age, disability, or sex.        "

## 2017-03-06 NOTE — PROGRESS NOTES
Subjective:   Patient ID:  Arie Salinas is a 89 y.o. male who presents for follow-up of Hospital f/u      HPI: Patient was recently seen in ER with symptoms of left pectoral area twitching. This occurred every few minutes for the morning hours, but stopped after patient arrived to ER.  ECG was normal, TP were negative, but BNP was elevated to 500. Additional blood work was c/w CKD.  CXR did not show pulmonary congestion.  He ws subsequently seen by Dr. Jo with chronic lower abdominal pain. CT is scheduled for this Saturday.    Amanda is frail, but does not have any specific complaints today. HE is chronically short of breath and has mild bilateral peripheral edema. He wears support socks.  He has no chest pain.  He has  Been maintained on Amiodarone for about a year for PAF.  Most recent set of PFT's was fine as per patient (I cannot find the results in the chart)    Lab Results   Component Value Date     02/22/2017    K 4.7 02/22/2017     02/22/2017    CO2 23 02/22/2017    BUN 18 02/22/2017    CREATININE 1.6 (H) 03/01/2017    GLU 88 02/22/2017    HGBA1C 6.1 06/30/2016    MG 2.1 12/07/2015    AST 24 02/22/2017    ALT 24 02/22/2017    ALBUMIN 3.3 (L) 02/22/2017    PROT 6.4 02/22/2017    BILITOT 0.6 02/22/2017    WBC 8.54 02/22/2017    HGB 12.7 (L) 02/22/2017    HCT 38.5 (L) 02/22/2017    MCV 93 02/22/2017     02/22/2017    INR 1.0 02/22/2017    PSA 0.80 01/06/2012    TSH 0.977 02/10/2017    CHOL 135 11/02/2016    HDL 52 11/02/2016    LDLCALC 69.0 11/02/2016    TRIG 70 11/02/2016       Review of Systems   Constitution: Negative.   HENT: Negative.    Eyes: Negative.    Cardiovascular: Positive for dyspnea on exertion and leg swelling. Negative for chest pain, claudication, irregular heartbeat, near-syncope, palpitations and syncope.   Respiratory: Negative.  Negative for cough, shortness of breath, snoring and wheezing.    Endocrine: Negative.  Negative for cold intolerance, heat  "intolerance, polydipsia, polyphagia and polyuria.   Skin: Negative.    Musculoskeletal: Positive for arthritis and back pain.   Gastrointestinal: Positive for abdominal pain.   Genitourinary: Negative.    Neurological: Positive for loss of balance.   Psychiatric/Behavioral: Negative.        Current Outpatient Prescriptions:     allopurinol (ZYLOPRIM) 300 MG tablet, Take 1 tablet (300 mg total) by mouth once daily., Disp: 90 tablet, Rfl: 3    amiodarone (PACERONE) 200 MG Tab, Take 1 tablet (200 mg total) by mouth once daily., Disp: 90 tablet, Rfl: 3    apixaban 2.5 mg Tab, Take 1 tablet (2.5 mg total) by mouth 2 (two) times daily., Disp: 180 tablet, Rfl: 3    aspirin 81 mg Tab, Take 81 mg by mouth Daily., Disp: , Rfl:     atorvastatin (LIPITOR) 20 MG tablet, Take 1 tablet (20 mg total) by mouth once daily., Disp: 90 tablet, Rfl: 3    furosemide (LASIX) 20 MG tablet, Take 20 mg by mouth once daily., Disp: , Rfl:     lisinopril (PRINIVIL,ZESTRIL) 20 MG tablet, Take 1 tablet (20 mg total) by mouth once daily., Disp: 90 tablet, Rfl: 3    metoprolol succinate (TOPROL-XL) 50 MG 24 hr tablet, Take 1 tablet (50 mg total) by mouth once daily., Disp: 90 tablet, Rfl: 3    multivitamin-minerals-lutein (CENTRUM SILVER) Tab, Take 1 tablet by mouth Daily., Disp: , Rfl:     tamsulosin (FLOMAX) 0.4 mg Cp24, Take 1 capsule (0.4 mg total) by mouth once daily., Disp: 90 capsule, Rfl: 3    Objective:   Physical Exam   Constitutional: He is oriented to person, place, and time. He appears well-developed and well-nourished.   BP (!) 119/57  Pulse (!) 55  Ht 5' 7.5" (1.715 m)  Wt 81.2 kg (179 lb 0.2 oz)  BMI 27.62 kg/m2     HENT:   Head: Normocephalic.   Eyes: Pupils are equal, round, and reactive to light.   Neck: Normal range of motion. Neck supple. No thyromegaly present.   Cardiovascular: Normal rate, regular rhythm and intact distal pulses.  Exam reveals no gallop and no friction rub.    Murmur heard.  High-pitched early " systolic murmur is present with a grade of 1/6  at the upper left sternal border  Pulses:       Carotid pulses are 2+ on the right side with bruit, and 2+ on the left side.       Radial pulses are 2+ on the right side, and 2+ on the left side.        Femoral pulses are 2+ on the right side, and 2+ on the left side.       Popliteal pulses are 2+ on the right side, and 2+ on the left side.        Dorsalis pedis pulses are 2+ on the right side, and 2+ on the left side.        Posterior tibial pulses are 2+ on the right side, and 2+ on the left side.   Pulmonary/Chest: Effort normal and breath sounds normal. No respiratory distress. He has no wheezes. He has no rales. He exhibits no tenderness.   Abdominal: Soft. Bowel sounds are normal.   Musculoskeletal: Normal range of motion. He exhibits no edema.   Neurological: He is alert and oriented to person, place, and time.   Skin: Skin is warm and dry.   Psychiatric: He has a normal mood and affect.   Nursing note and vitals reviewed.      Assessment and Plan:     Problem List Items Addressed This Visit        Cardiology Problems    Hyperlipidemia (Chronic)    Hypertension (Chronic)    Coronary artery disease (Chronic)    Mitral valve regurgitation (Chronic)    Renal artery stenosis (Chronic)    PVC's (premature ventricular contractions) (Chronic)    PAD (peripheral artery disease)    Atrial fibrillation    Acute on chronic diastolic heart failure - Primary       Other    Anemia of chronic renal failure (Chronic)    CKD (chronic kidney disease) stage 3, GFR 30-59 ml/min (Chronic)    Relevant Orders    2D echo with color flow doppler    Brain natriuretic peptide    Basic metabolic panel    History of renal stent    Right carotid bruit    On amiodarone therapy      Symptoms were a little atypical, but certainly if CFD ha new wall motion abnormalities or depressed LV EF he may need repeat stress test.  BNP is slightly elevated, probably a combination of HFpEF and CKD.  Trial  of 1 week low dose Lasix and repeat blood work in 1 week.  Keep an appointment with nephrology.  Abdominal pain does not suggest mesenteric ischemia, but will review CT results and advise.      Return in about 6 months (around 9/6/2017).

## 2017-03-11 ENCOUNTER — HOSPITAL ENCOUNTER (OUTPATIENT)
Dept: RADIOLOGY | Facility: HOSPITAL | Age: 82
Discharge: HOME | End: 2017-03-11
Attending: INTERNAL MEDICINE
Payer: MEDICARE

## 2017-03-11 DIAGNOSIS — R14.0 ABDOMINAL DISTENSION: ICD-10-CM

## 2017-03-11 PROCEDURE — 74177 CT ABD & PELVIS W/CONTRAST: CPT | Mod: TC

## 2017-03-11 PROCEDURE — 25500020 PHARM REV CODE 255: Performed by: INTERNAL MEDICINE

## 2017-03-11 PROCEDURE — 74177 CT ABD & PELVIS W/CONTRAST: CPT | Mod: 26,,, | Performed by: RADIOLOGY

## 2017-03-11 RX ADMIN — IOHEXOL 75 ML: 350 INJECTION, SOLUTION INTRAVENOUS at 01:03

## 2017-03-13 ENCOUNTER — TELEPHONE (OUTPATIENT)
Dept: GASTROENTEROLOGY | Facility: CLINIC | Age: 82
End: 2017-03-13

## 2017-03-13 NOTE — TELEPHONE ENCOUNTER
----- Message from Miguel Angel Jo MD sent at 3/13/2017  8:16 AM CDT -----  Please call pt, the scan was ok, it did not show anything serious. There was a small nodule in the lungs, as long as his primary follows up on this, it doesn't need anything else for now.. He should f/u in gi- either me or NP

## 2017-03-13 NOTE — TELEPHONE ENCOUNTER
Patient aware and expressed understanding.  Scheduled to see Dr.James Jo in follow up on 4/12/2017 for 11:00.

## 2017-03-17 ENCOUNTER — CLINICAL SUPPORT (OUTPATIENT)
Dept: CARDIOLOGY | Facility: CLINIC | Age: 82
End: 2017-03-17
Payer: MEDICARE

## 2017-03-17 ENCOUNTER — LAB VISIT (OUTPATIENT)
Dept: LAB | Facility: HOSPITAL | Age: 82
End: 2017-03-17
Attending: INTERNAL MEDICINE
Payer: MEDICARE

## 2017-03-17 DIAGNOSIS — N18.30 CKD (CHRONIC KIDNEY DISEASE) STAGE 3, GFR 30-59 ML/MIN: Chronic | ICD-10-CM

## 2017-03-17 LAB
ANION GAP SERPL CALC-SCNC: 9 MMOL/L
AORTIC VALVE STENOSIS: ABNORMAL
BNP SERPL-MCNC: 216 PG/ML
BUN SERPL-MCNC: 30 MG/DL
CALCIUM SERPL-MCNC: 8.4 MG/DL
CHLORIDE SERPL-SCNC: 106 MMOL/L
CO2 SERPL-SCNC: 25 MMOL/L
CREAT SERPL-MCNC: 1.5 MG/DL
DIASTOLIC DYSFUNCTION: YES
EST. GFR  (AFRICAN AMERICAN): 47 ML/MIN/1.73 M^2
EST. GFR  (NON AFRICAN AMERICAN): 40.7 ML/MIN/1.73 M^2
ESTIMATED PA SYSTOLIC PRESSURE: 39.24
GLUCOSE SERPL-MCNC: 65 MG/DL
MITRAL VALVE REGURGITATION: ABNORMAL
POTASSIUM SERPL-SCNC: 4.9 MMOL/L
RETIRED EF AND QEF - SEE NOTES: 60 (ref 55–65)
SODIUM SERPL-SCNC: 140 MMOL/L
TRICUSPID VALVE REGURGITATION: ABNORMAL

## 2017-03-17 PROCEDURE — 93306 TTE W/DOPPLER COMPLETE: CPT | Mod: 26,S$PBB,, | Performed by: INTERNAL MEDICINE

## 2017-03-17 PROCEDURE — 83880 ASSAY OF NATRIURETIC PEPTIDE: CPT

## 2017-03-17 PROCEDURE — 80048 BASIC METABOLIC PNL TOTAL CA: CPT

## 2017-03-20 ENCOUNTER — TELEPHONE (OUTPATIENT)
Dept: CARDIOLOGY | Facility: CLINIC | Age: 82
End: 2017-03-20

## 2017-03-20 NOTE — TELEPHONE ENCOUNTER
----- Message from Kerri Gonzalez MD sent at 3/17/2017  5:14 PM CDT -----  Mr. Salinas,  Please review results of you heart echo. It did not show any changes from the last one. Overall no new findings to indicate reasons for worsened edema.  I suggest support socks and avoiding salt and elevating feet while seating. I hope Lasix helped too.  Blood work looks unchanged. I am still waiting for 1 blood work results.  IT appears that CT did not show any significant abnormalities either. Good news.

## 2017-03-29 ENCOUNTER — OFFICE VISIT (OUTPATIENT)
Dept: NEPHROLOGY | Facility: CLINIC | Age: 82
End: 2017-03-29
Payer: MEDICARE

## 2017-03-29 VITALS
HEIGHT: 67 IN | HEART RATE: 62 BPM | DIASTOLIC BLOOD PRESSURE: 64 MMHG | WEIGHT: 173.75 LBS | OXYGEN SATURATION: 96 % | SYSTOLIC BLOOD PRESSURE: 124 MMHG | BODY MASS INDEX: 27.27 KG/M2

## 2017-03-29 DIAGNOSIS — N18.30 CKD (CHRONIC KIDNEY DISEASE) STAGE 3, GFR 30-59 ML/MIN: Primary | Chronic | ICD-10-CM

## 2017-03-29 PROCEDURE — 99213 OFFICE O/P EST LOW 20 MIN: CPT | Mod: S$PBB,GC,, | Performed by: INTERNAL MEDICINE

## 2017-03-29 PROCEDURE — 99213 OFFICE O/P EST LOW 20 MIN: CPT | Mod: PBBFAC | Performed by: INTERNAL MEDICINE

## 2017-03-29 PROCEDURE — 99999 PR PBB SHADOW E&M-EST. PATIENT-LVL III: CPT | Mod: PBBFAC,GC,,

## 2017-03-29 RX ORDER — NIFEDIPINE 30 MG/1
30 TABLET, EXTENDED RELEASE ORAL DAILY
Qty: 30 TABLET | Refills: 11 | Status: SHIPPED | OUTPATIENT
Start: 2017-03-29 | End: 2017-04-10

## 2017-03-29 NOTE — PROGRESS NOTES
CHIEF COMPLAINT/HPI: Arie is a 89 y.o. male for evaluation of CKD 3.       89 yo M with PMHx of HTN, renal artery stent with no stenosis on recent renal artery US, PAFIB on apixiban, CAD, CKD 3, Mitral regurgitation, PAD, HLP.   Mr Salinas does not have any complaints. He recently took one week of daily lasix 20 mg per his cardiologist. He does have trace LE edema but he wears support socks three times per week that he is very happy with.   His renal panel and creatinine have been stable with a GFR of 40. BP is well controlled at 124/64, 62.          No chief complaint on file.    Past Medical History:   Diagnosis Date    Allergy     Arthritis     Atrial fibrillation 12/4/2015    Basal cell carcinoma     Chronic diastolic heart failure     Colon polyp     Coronary artery disease     Elevated PSA     Hyperlipidemia     Hypertension     Moderate mitral regurgitation     Prostate cancer     PVD (peripheral vascular disease)     Squamous cell carcinoma        Arie reports that he quit smoking about 19 years ago. His smoking use included Cigars. He quit after 30.00 years of use. He has never used smokeless tobacco. He reports that he drinks about 3.6 oz of alcohol per week  He reports that he does not use illicit drugs.    Family History   Problem Relation Age of Onset    Cancer Mother     No Known Problems Father     No Known Problems Sister     No Known Problems Maternal Grandmother     No Known Problems Maternal Grandfather     No Known Problems Paternal Grandmother     No Known Problems Paternal Grandfather     No Known Problems Brother     No Known Problems Maternal Aunt     No Known Problems Maternal Uncle     No Known Problems Paternal Aunt     No Known Problems Paternal Uncle     Heart disease Neg Hx     Diabetes Neg Hx     Colon polyps Neg Hx     Melanoma Neg Hx     Eczema Neg Hx     Psoriasis Neg Hx     Anemia Neg Hx     Arrhythmia Neg Hx     Asthma Neg Hx     Clotting  "disorder Neg Hx     Fainting Neg Hx     Heart attack Neg Hx     Heart failure Neg Hx     Hyperlipidemia Neg Hx     Hypertension Neg Hx     Stroke Neg Hx     Atrial Septal Defect Neg Hx        ROS:    General: No fever, chills, change in appetite or weight loss  Skin: No rashes or pruritus  Head: No headaches or recent head trauma  Eyes: No changes in vision or eye pain  Nodes: No swollen glands  Pulmonary: No dyspnea, wheezes, cough or sputum production  Cardiovascular: No chest pain, edema, PND, orthopnea or reduced exercise tolerance  Abdomen: No abdominal pain, nausea, vomiting, constipation or diarrhea  Urinary: No flank pain, dysuria or hematuria  Peripheral Vascular: No claudication or cyanosis  Neurologic: No history of seizures, tremors, forcal weakness or numbness    PE:  /74 HR 60    Vitals:    03/29/17 1425   BP: 124/64   BP Location: Right arm   Patient Position: Sitting   Pulse: 62   SpO2: 96%   Weight: 78.8 kg (173 lb 11.6 oz)   Height: 5' 7" (1.702 m)       HEENT: Normocephalic, atraumatic, EOMI, PERRLA, normal conjunctive and lids, supple neck with no thyromeglay or masses, normal oropharynx, hearing intact  Cardiovascular: Irregular without murmur, gallop or rub, trace edema  Respiratory: Clear bilaterally without rales, rhonchi, wheezes with normal effort  Abdomen: Soft, nontender/nondistended, positive bowel sounds, no hepatospenomegaly  Extremities: No cyanosis, clubbing, normal gait  Skin: No rashes, ulcers, induration  Psych: Oriented x 3 with normal mood and effect    Impression/Plan:    1. CKD (chronic kidney disease) stage 3, GFR 30-59 ml/min      1. CKD 3:     89 yo M with PMHx of HTN, renal artery stent with no stenosis on recent renal artery US, PAFIB on apixiban, CAD, CKD 3 due to HTN, Mitral regurgitation, PAD, HLP.   Mr Salinas does not have any complaints. He recently took one week of daily lasix 20 mg per his cardiologist. He does have trace LE edema but he wears support " socks three times per week that he is very happy with.   His renal panel and creatinine have been stable with a GFR of 40. BP is well controlled at 124/64, 62.     He is on lisinopril, metoprolol and flomax which he will continue .     Uric acid is WNL, he is on allopurinol.      Lab Results   Component Value Date    CREATININE 1.5 (H) 03/17/2017    CREATININE 1.5 (H) 03/17/2017     Protein Creatinine Ratios:    Prot/Creat Ratio, Ur   Date Value Ref Range Status   08/15/2016 0.11 0.00 - 0.20 Final   07/19/2016 0.09 0.00 - 0.20 Final   06/02/2016 Unable to calculate 0.00 - 0.20 Final     ·   ·   Acid-Base:   Lab Results   Component Value Date     03/17/2017     03/17/2017    K 4.9 03/17/2017    K 4.9 03/17/2017    CO2 25 03/17/2017    CO2 25 03/17/2017     2. HTN: Blood pressures- well controlled on current regimen.     3. Renal osteodystrophy: last PTH   Lab Results   Component Value Date    PTH 46.0 08/15/2016    CALCIUM 8.4 (L) 03/17/2017    CALCIUM 8.9 03/17/2017    PHOS 3.3 03/17/2017       4. Anemia:   TSAT 29  Lab Results   Component Value Date    HGB 12.7 (L) 02/22/2017        5. DM:  Last HbA1C   Lab Results   Component Value Date    HGBA1C 6.1 06/30/2016       6. Lipid management:   Lab Results   Component Value Date    LDLCALC 69.0 11/02/2016         Follow up in 6 months to review results.   Seen with Dr Jay Bullock MD  Nephrology Fellow  Pager [phone]   Cell [phone]

## 2017-04-05 ENCOUNTER — TELEPHONE (OUTPATIENT)
Dept: NEPHROLOGY | Facility: CLINIC | Age: 82
End: 2017-04-05

## 2017-04-10 ENCOUNTER — OFFICE VISIT (OUTPATIENT)
Dept: INTERNAL MEDICINE | Facility: CLINIC | Age: 82
End: 2017-04-10
Payer: MEDICARE

## 2017-04-10 VITALS
HEIGHT: 67 IN | SYSTOLIC BLOOD PRESSURE: 118 MMHG | OXYGEN SATURATION: 96 % | DIASTOLIC BLOOD PRESSURE: 60 MMHG | BODY MASS INDEX: 27.82 KG/M2 | WEIGHT: 177.25 LBS | HEART RATE: 54 BPM

## 2017-04-10 DIAGNOSIS — M25.551 PAIN OF BOTH HIP JOINTS: ICD-10-CM

## 2017-04-10 DIAGNOSIS — I34.0 NON-RHEUMATIC MITRAL REGURGITATION: Chronic | ICD-10-CM

## 2017-04-10 DIAGNOSIS — I70.1 RENAL ARTERY STENOSIS: Chronic | ICD-10-CM

## 2017-04-10 DIAGNOSIS — M25.552 PAIN OF BOTH HIP JOINTS: ICD-10-CM

## 2017-04-10 DIAGNOSIS — E78.5 HYPERLIPIDEMIA, UNSPECIFIED HYPERLIPIDEMIA TYPE: ICD-10-CM

## 2017-04-10 DIAGNOSIS — I10 ESSENTIAL HYPERTENSION: Primary | ICD-10-CM

## 2017-04-10 PROCEDURE — 99999 PR PBB SHADOW E&M-EST. PATIENT-LVL III: CPT | Mod: PBBFAC,,, | Performed by: INTERNAL MEDICINE

## 2017-04-10 PROCEDURE — 99214 OFFICE O/P EST MOD 30 MIN: CPT | Mod: S$PBB,,, | Performed by: INTERNAL MEDICINE

## 2017-04-10 PROCEDURE — 99213 OFFICE O/P EST LOW 20 MIN: CPT | Mod: PBBFAC | Performed by: INTERNAL MEDICINE

## 2017-04-10 RX ORDER — LISINOPRIL 20 MG/1
20 TABLET ORAL DAILY
Qty: 90 TABLET | Refills: 3 | Status: SHIPPED | OUTPATIENT
Start: 2017-04-10 | End: 2018-01-22

## 2017-04-10 NOTE — PROGRESS NOTES
Subjective:       Patient ID: Arie Salinas is a 89 y.o. male.    Chief Complaint: Follow-up    HPI the patient is an 89-year-old male comes in for general checkup.  He comes in with his daughter, Ms. Simpson.  She reports that he is having some pain in his hips.  He also reports pain in his hips which had been treated by steroid injections in the past.  He denies need for repeat surgery injection at this time.  The patient was concerned about his blood pressure medications.  His nephrologist recently switched medications to Procardia for unclear reasons to the patient.  He wishes to stay on his lisinopril and has not been using the Procardia.  He request a refill of his lisinopril.  The patient is not involved in any outside activities.  He is concerned about his wife and has been caring for her.  He has limited his activity due to pain in his hip and arthritic changes.  He walks with the aid of a cane.  He is not having any chest pain or shortness of breath this time.  Review of Systems   Constitutional: Negative for appetite change, fatigue and unexpected weight change.   HENT: Negative for congestion and sore throat.    Eyes: Negative for visual disturbance.   Respiratory: Negative for cough, chest tightness, shortness of breath and wheezing.    Cardiovascular: Negative for chest pain and palpitations.   Gastrointestinal: Negative for abdominal distention, abdominal pain, blood in stool, constipation, diarrhea and nausea.   Genitourinary: Negative for difficulty urinating, dysuria, frequency and urgency.   Musculoskeletal: Positive for arthralgias. Negative for myalgias and neck stiffness.   Skin: Negative for rash.   Neurological: Negative for dizziness, weakness and headaches.        Mild difficulty with balance.   Psychiatric/Behavioral: Negative for behavioral problems, hallucinations and self-injury.       Objective:      Physical Exam   Constitutional: He is oriented to person, place, and time. He  appears well-developed. No distress.   HENT:   Head: Normocephalic.   Mouth/Throat: No oropharyngeal exudate.   Eyes: Conjunctivae and EOM are normal. Right eye exhibits no discharge. Left eye exhibits no discharge. No scleral icterus.   Fundi benign bilaterally.   Neck: Normal range of motion. No JVD present. No tracheal deviation present. No thyromegaly present.   Cardiovascular: Normal rate, regular rhythm and normal heart sounds.  Exam reveals no gallop and no friction rub.    No murmur heard.  2/6 systolic murmur.  No extrasystoles.   Pulmonary/Chest: Effort normal and breath sounds normal. No respiratory distress. He has no wheezes. He has no rales. He exhibits no tenderness.   Abdominal: Soft. Bowel sounds are normal. He exhibits no distension and no mass. There is no tenderness. There is no rebound and no guarding.   Genitourinary: Rectum normal, prostate normal and penis normal.   Musculoskeletal: Normal range of motion. He exhibits no edema or tenderness.   Patient's gait is somewhat slow in tenuous.   Lymphadenopathy:     He has no cervical adenopathy.   Neurological: He is alert and oriented to person, place, and time. He has normal reflexes. He displays normal reflexes. No cranial nerve deficit. He exhibits normal muscle tone. Coordination normal.   Skin: Skin is warm and dry. No rash noted. He is not diaphoretic. No erythema. No pallor.   Psychiatric: He has a normal mood and affect. His behavior is normal. Thought content normal.       Assessment:       1. Essential hypertension    2. Hyperlipidemia, unspecified hyperlipidemia type    3. Non-rheumatic mitral regurgitation    4. Renal artery stenosis    5. Pain of both hip joints      the patient's labs were reviewed and demonstrates stable renal function though the laboratory studies were unremarkable.  Plan:       1.  Refill lisinopril 20 mg by mouth daily   2.  Join exercise group at the [facility] with his wife  3.  Return to clinic in 6 months

## 2017-04-10 NOTE — MR AVS SNAPSHOT
[facility]  [address]  [facility] [address]  Phone: [phone]  Fax: [phone]                  Arie Salinas   4/10/2017 3:00 PM   Office Visit    Description:  Male : 6/15/1927   Provider:  Collin Mendes Jr., MD   Department:  [facility]           Reason for Visit     Follow-up                To Do List           Future Appointments        Provider Department Dept Phone    2017 4:00 PM Miguel Angel Jo MD [facility] [phone]      Goals (5 Years of Data)     None      Follow-Up and Disposition     Return in about 6 months (around 10/10/2017).       These Medications        Disp Refills Start End    lisinopril (PRINIVIL,ZESTRIL) 20 MG tablet 90 tablet 3 4/10/2017     Take 1 tablet (20 mg total) by mouth once daily. - Oral    Pharmacy: [facility] - [address] Ph #: [phone]         [facility]     [facility] On Call Nurse Care Line -  Assistance  Unless otherwise directed by your provider, please contact [facility], our nurse care line that is available for  assistance.     Registered nurses in the [facility] provide: appointment scheduling, clinical advisement, health education, and other advisory services.  Call: [phone] (toll free)               Medications           Message regarding Medications     Verify the changes and/or additions to your medication regime listed below are the same as discussed with your clinician today.  If any of these changes or additions are incorrect, please notify your healthcare provider.        START taking these NEW medications        Refills    lisinopril (PRINIVIL,ZESTRIL) 20 MG tablet 3    Sig: Take 1 tablet (20 mg total) by mouth once daily.    Class: Normal    Route: Oral      STOP taking these medications     nifedipine 30 MG ORAL TR24 (PROCARDIA-XL) 30 MG (OSM) 24 hr tablet Take 1 tablet (30  "mg total) by mouth once daily.           Verify that the below list of medications is an accurate representation of the medications you are currently taking.  If none reported, the list may be blank. If incorrect, please contact your healthcare provider. Carry this list with you in case of emergency.           Current Medications     allopurinol (ZYLOPRIM) 300 MG tablet Take 1 tablet (300 mg total) by mouth once daily.    amiodarone (PACERONE) 200 MG Tab Take 1 tablet (200 mg total) by mouth once daily.    apixaban 2.5 mg Tab Take 1 tablet (2.5 mg total) by mouth 2 (two) times daily.    aspirin 81 mg Tab Take 81 mg by mouth Daily.    atorvastatin (LIPITOR) 20 MG tablet Take 1 tablet (20 mg total) by mouth once daily.    furosemide (LASIX) 20 MG tablet Take 1 tablet (20 mg total) by mouth once daily.    lisinopril (PRINIVIL,ZESTRIL) 20 MG tablet Take 1 tablet (20 mg total) by mouth once daily.    metoprolol succinate (TOPROL-XL) 50 MG 24 hr tablet Take 1 tablet (50 mg total) by mouth once daily.    multivitamin-minerals-lutein (CENTRUM SILVER) Tab Take 1 tablet by mouth Daily.    tamsulosin (FLOMAX) 0.4 mg Cp24 Take 1 capsule (0.4 mg total) by mouth once daily.           Clinical Reference Information           Your Vitals Were     BP Pulse Height Weight SpO2 BMI    118/60 (BP Location: Left arm, Patient Position: Sitting, BP Method: Automatic) 54 5' 7" (1.702 m) 80.4 kg (177 lb 4 oz) 96% 27.76 kg/m2      Blood Pressure          Most Recent Value    BP  118/60      Allergies as of 4/10/2017     Iodine      Immunizations Administered on Date of Encounter - 4/10/2017     None      Language Assistance Services     ATTENTION: Language assistance services are available, free of charge. Please call [phone].      ATENCIÓN: Si kinza curtis, tiene a umanzor disposición servicios gratuitos de asistencia lingüística. Llame al [phone].     CHÚ Ý: N?u b?n nói Ti?ng Vi?t, có các d?ch v? h? tr? ngôn ng? mi?n phí dành cho " b?n. G?i s? [phone].         [facility] complies with applicable Federal civil rights laws and does not discriminate on the basis of race, color, national origin, age, disability, or sex.

## 2017-04-11 ENCOUNTER — OFFICE VISIT (OUTPATIENT)
Dept: GASTROENTEROLOGY | Facility: CLINIC | Age: 82
End: 2017-04-11
Payer: MEDICARE

## 2017-04-11 VITALS
DIASTOLIC BLOOD PRESSURE: 59 MMHG | WEIGHT: 175.06 LBS | SYSTOLIC BLOOD PRESSURE: 118 MMHG | HEART RATE: 54 BPM | HEIGHT: 67 IN | BODY MASS INDEX: 27.48 KG/M2

## 2017-04-11 DIAGNOSIS — R10.84 ABDOMINAL PAIN, GENERALIZED: Primary | ICD-10-CM

## 2017-04-11 PROCEDURE — 99213 OFFICE O/P EST LOW 20 MIN: CPT | Mod: S$PBB,,, | Performed by: INTERNAL MEDICINE

## 2017-04-11 PROCEDURE — 99999 PR PBB SHADOW E&M-EST. PATIENT-LVL III: CPT | Mod: PBBFAC,,, | Performed by: INTERNAL MEDICINE

## 2017-04-11 PROCEDURE — 99213 OFFICE O/P EST LOW 20 MIN: CPT | Mod: PBBFAC | Performed by: INTERNAL MEDICINE

## 2017-04-11 RX ORDER — HYOSCYAMINE SULFATE 0.12 MG/1
0.12 TABLET SUBLINGUAL EVERY 6 HOURS PRN
Qty: 30 TABLET | Refills: 6 | Status: SHIPPED | OUTPATIENT
Start: 2017-04-11 | End: 2018-01-22

## 2017-04-11 NOTE — PROGRESS NOTES
"HISTORY OF PRESENT ILLNESS:  This is a followup and counseling session.  Mr. Salinas who goes by the courtney name "Randy" is here today with his wife and his   daughter, Duyen, who is visiting from [place], [place].    When I saw Mr. Salinas in February, he was complaining of abdominal discomfort.    I was concerned on my physical exam that there might be some ascites or fullness   in his abdomen; therefore, I ordered a CAT scan.  The CAT scan was okay.  It   did not show any ascites.  It did not show anything of concern.  There was a   hypodensity in the pancreas, but this is unchanged since six years.    I think after the CAT scan and its normal findings, Mr. Salinas feels a sense of   reassurance and he is actually doing better now than he was before.  He does   have this occasional discomfort in the abdomen, and it is typically in the mid   portion of the abdomen on either side.  It does not localize but it does not   radiate.  It seems to be worse in the evenings when he is not doing anything.    He says that during the day when he is active, he never notices it.  It is   definitely not postprandial.  There are no foods that cause it.  It is not   related to his bowel movements at all.  In general, his bowel movements are   normal, although he does have a tendency towards constipation.  He eats small   meals in general, but this is by his preference, this is not a change.  He   denies any heartburn.  He has told family at times that the pain is a cramping   type pain, but he does not describe that in that manner to me today.    ASSESSMENT AND DECISION MAKING:  Abdominal discomfort.  As noted before, he does   have a complicated colon history with a resection of a colon mass many years   ago; however, he has had a number of colonoscopies since that time, which have   been normal and the last colonoscopy was in 2010, and it was likewise normal.    Given his age 89, I really do not want to recommend a " colonoscopy.  I think it   is unlikely this represents anything serious.  Although it is unlikely for   irritable bowel or functional pain begin at this age, I am going to approach it   in that way for right now.  I tried to give him reassurance.  We are going to   try some Levsin as needed at the onset of pain to see if that helps.  I have   asked him to stay in touch and let me know if the pain gets worse or changes in   anyway, and he is comfortable with this approach.  Twenty-minute appointment,   greater than half the time face-to-face counseling.      RADHA  dd: 04/11/2017 16:37:16 (CDT)  td: 04/12/2017 12:54:10 (CDT)  Doc ID   #[id number]  Job ID #[id number]    CC:

## 2017-06-15 DIAGNOSIS — N40.0 PROSTATISM: ICD-10-CM

## 2017-06-15 RX ORDER — TAMSULOSIN HYDROCHLORIDE 0.4 MG/1
CAPSULE ORAL
Qty: 90 CAPSULE | Refills: 0 | Status: SHIPPED | OUTPATIENT
Start: 2017-06-15 | End: 2017-09-04 | Stop reason: SDUPTHER

## 2017-07-03 RX ORDER — ALLOPURINOL 300 MG/1
300 TABLET ORAL DAILY
Qty: 90 TABLET | Refills: 3 | Status: SHIPPED | OUTPATIENT
Start: 2017-07-03 | End: 2017-07-10 | Stop reason: SDUPTHER

## 2017-07-10 DIAGNOSIS — E78.5 HYPERLIPIDEMIA, UNSPECIFIED HYPERLIPIDEMIA TYPE: ICD-10-CM

## 2017-07-11 RX ORDER — ATORVASTATIN CALCIUM 20 MG/1
TABLET, FILM COATED ORAL
Qty: 90 TABLET | Refills: 3 | Status: SHIPPED | OUTPATIENT
Start: 2017-07-11 | End: 2018-01-10 | Stop reason: SDUPTHER

## 2017-07-11 RX ORDER — ALLOPURINOL 300 MG/1
300 TABLET ORAL DAILY
Qty: 90 TABLET | Refills: 3 | Status: SHIPPED | OUTPATIENT
Start: 2017-07-11 | End: 2018-01-26 | Stop reason: SDUPTHER

## 2017-07-20 ENCOUNTER — TELEPHONE (OUTPATIENT)
Dept: INTERNAL MEDICINE | Facility: CLINIC | Age: 82
End: 2017-07-20

## 2017-07-20 NOTE — TELEPHONE ENCOUNTER
----- Message from Hollis Torres MA sent at 7/20/2017  3:45 PM CDT -----  Contact: Deanne garcia/Linda Lawtell- [phone]   Please advise if forms have been received on the patient and wife  Shelia. Stated these form are needed no later than tomorrow evening. Please call. Thanks!

## 2017-07-20 NOTE — TELEPHONE ENCOUNTER
Spoke to Deanne at [facility] and states that they need these forms by Tuesday July 25th----pls fill out ASAP----forms in Dr. Mendes box

## 2017-07-24 ENCOUNTER — TELEPHONE (OUTPATIENT)
Dept: CARDIOLOGY | Facility: CLINIC | Age: 82
End: 2017-07-24

## 2017-07-24 DIAGNOSIS — I10 HYPERTENSION, ESSENTIAL: Primary | ICD-10-CM

## 2017-07-24 DIAGNOSIS — E78.00 PURE HYPERCHOLESTEROLEMIA: ICD-10-CM

## 2017-08-28 RX ORDER — AMIODARONE HYDROCHLORIDE 200 MG/1
TABLET ORAL
Qty: 90 TABLET | Refills: 3 | Status: SHIPPED | OUTPATIENT
Start: 2017-08-28 | End: 2018-03-07 | Stop reason: SDUPTHER

## 2017-09-04 DIAGNOSIS — N40.0 PROSTATISM: ICD-10-CM

## 2017-09-05 RX ORDER — TAMSULOSIN HYDROCHLORIDE 0.4 MG/1
CAPSULE ORAL
Qty: 90 CAPSULE | Refills: 0 | Status: SHIPPED | OUTPATIENT
Start: 2017-09-05 | End: 2017-12-19 | Stop reason: SDUPTHER

## 2017-09-25 ENCOUNTER — LAB VISIT (OUTPATIENT)
Dept: LAB | Facility: HOSPITAL | Age: 82
End: 2017-09-25
Attending: INTERNAL MEDICINE
Payer: MEDICARE

## 2017-09-25 DIAGNOSIS — I10 HYPERTENSION, ESSENTIAL: ICD-10-CM

## 2017-09-25 DIAGNOSIS — E78.00 PURE HYPERCHOLESTEROLEMIA: ICD-10-CM

## 2017-09-25 DIAGNOSIS — N18.30 CKD (CHRONIC KIDNEY DISEASE) STAGE 3, GFR 30-59 ML/MIN: Chronic | ICD-10-CM

## 2017-09-25 LAB
ALBUMIN SERPL BCP-MCNC: 3 G/DL
ALBUMIN SERPL BCP-MCNC: 3 G/DL
ALP SERPL-CCNC: 105 U/L
ALT SERPL W/O P-5'-P-CCNC: 22 U/L
ANION GAP SERPL CALC-SCNC: 5 MMOL/L
ANION GAP SERPL CALC-SCNC: 5 MMOL/L
AST SERPL-CCNC: 25 U/L
BILIRUB SERPL-MCNC: 0.9 MG/DL
BUN SERPL-MCNC: 26 MG/DL
BUN SERPL-MCNC: 26 MG/DL
CALCIUM SERPL-MCNC: 8.9 MG/DL
CALCIUM SERPL-MCNC: 8.9 MG/DL
CHLORIDE SERPL-SCNC: 110 MMOL/L
CHLORIDE SERPL-SCNC: 110 MMOL/L
CHOLEST SERPL-MCNC: 115 MG/DL
CHOLEST/HDLC SERPL: 2.9 {RATIO}
CO2 SERPL-SCNC: 24 MMOL/L
CO2 SERPL-SCNC: 24 MMOL/L
CREAT SERPL-MCNC: 1.5 MG/DL
CREAT SERPL-MCNC: 1.5 MG/DL
EST. GFR  (AFRICAN AMERICAN): 46.7 ML/MIN/1.73 M^2
EST. GFR  (AFRICAN AMERICAN): 46.7 ML/MIN/1.73 M^2
EST. GFR  (NON AFRICAN AMERICAN): 40.4 ML/MIN/1.73 M^2
EST. GFR  (NON AFRICAN AMERICAN): 40.4 ML/MIN/1.73 M^2
GLUCOSE SERPL-MCNC: 98 MG/DL
GLUCOSE SERPL-MCNC: 98 MG/DL
HDLC SERPL-MCNC: 40 MG/DL
HDLC SERPL: 34.8 %
LDLC SERPL CALC-MCNC: 56.6 MG/DL
NONHDLC SERPL-MCNC: 75 MG/DL
PHOSPHATE SERPL-MCNC: 3.3 MG/DL
POTASSIUM SERPL-SCNC: 4.8 MMOL/L
POTASSIUM SERPL-SCNC: 4.8 MMOL/L
PROT SERPL-MCNC: 6.1 G/DL
SODIUM SERPL-SCNC: 139 MMOL/L
SODIUM SERPL-SCNC: 139 MMOL/L
TRIGL SERPL-MCNC: 92 MG/DL
TSH SERPL DL<=0.005 MIU/L-ACNC: 1.28 UIU/ML

## 2017-09-25 PROCEDURE — 80061 LIPID PANEL: CPT

## 2017-09-25 PROCEDURE — 80053 COMPREHEN METABOLIC PANEL: CPT

## 2017-09-25 PROCEDURE — 84443 ASSAY THYROID STIM HORMONE: CPT

## 2017-09-25 PROCEDURE — 36415 COLL VENOUS BLD VENIPUNCTURE: CPT | Mod: PO

## 2017-09-25 PROCEDURE — 84100 ASSAY OF PHOSPHORUS: CPT

## 2017-09-26 ENCOUNTER — TELEPHONE (OUTPATIENT)
Dept: CARDIOLOGY | Facility: CLINIC | Age: 82
End: 2017-09-26

## 2017-09-26 NOTE — TELEPHONE ENCOUNTER
----- Message from Kerri Gonzalez MD sent at 9/26/2017  2:12 PM CDT -----  Please review.  We will discuss the results during your upcoming visit with me. It is unchanged

## 2017-10-02 ENCOUNTER — IMMUNIZATION (OUTPATIENT)
Dept: INTERNAL MEDICINE | Facility: CLINIC | Age: 82
End: 2017-10-02
Payer: MEDICARE

## 2017-10-02 ENCOUNTER — OFFICE VISIT (OUTPATIENT)
Dept: CARDIOLOGY | Facility: CLINIC | Age: 82
End: 2017-10-02
Payer: MEDICARE

## 2017-10-02 VITALS
WEIGHT: 171.06 LBS | DIASTOLIC BLOOD PRESSURE: 56 MMHG | HEART RATE: 55 BPM | SYSTOLIC BLOOD PRESSURE: 115 MMHG | BODY MASS INDEX: 26.85 KG/M2 | HEIGHT: 67 IN

## 2017-10-02 DIAGNOSIS — I48.0 PAROXYSMAL ATRIAL FIBRILLATION: ICD-10-CM

## 2017-10-02 DIAGNOSIS — N18.30 CKD (CHRONIC KIDNEY DISEASE) STAGE 3, GFR 30-59 ML/MIN: Chronic | ICD-10-CM

## 2017-10-02 DIAGNOSIS — I10 ESSENTIAL HYPERTENSION: ICD-10-CM

## 2017-10-02 DIAGNOSIS — E78.2 MIXED HYPERLIPIDEMIA: Chronic | ICD-10-CM

## 2017-10-02 DIAGNOSIS — I25.10 CORONARY ARTERY DISEASE INVOLVING NATIVE CORONARY ARTERY OF NATIVE HEART WITHOUT ANGINA PECTORIS: Primary | Chronic | ICD-10-CM

## 2017-10-02 DIAGNOSIS — I50.33 ACUTE ON CHRONIC DIASTOLIC HEART FAILURE: ICD-10-CM

## 2017-10-02 DIAGNOSIS — I73.9 PAD (PERIPHERAL ARTERY DISEASE): ICD-10-CM

## 2017-10-02 DIAGNOSIS — I49.3 PVC'S (PREMATURE VENTRICULAR CONTRACTIONS): Chronic | ICD-10-CM

## 2017-10-02 DIAGNOSIS — I70.1 RENAL ARTERY STENOSIS: Chronic | ICD-10-CM

## 2017-10-02 DIAGNOSIS — I34.0 NON-RHEUMATIC MITRAL REGURGITATION: Chronic | ICD-10-CM

## 2017-10-02 DIAGNOSIS — Z79.899 ON AMIODARONE THERAPY: ICD-10-CM

## 2017-10-02 DIAGNOSIS — R09.89 RIGHT CAROTID BRUIT: ICD-10-CM

## 2017-10-02 PROCEDURE — 99999 PR PBB SHADOW E&M-EST. PATIENT-LVL III: CPT | Mod: PBBFAC,,, | Performed by: INTERNAL MEDICINE

## 2017-10-02 PROCEDURE — 1159F MED LIST DOCD IN RCRD: CPT | Mod: ,,, | Performed by: INTERNAL MEDICINE

## 2017-10-02 PROCEDURE — 99213 OFFICE O/P EST LOW 20 MIN: CPT | Mod: PBBFAC,PO | Performed by: INTERNAL MEDICINE

## 2017-10-02 PROCEDURE — 99214 OFFICE O/P EST MOD 30 MIN: CPT | Mod: S$PBB,,, | Performed by: INTERNAL MEDICINE

## 2017-10-02 PROCEDURE — G0008 ADMIN INFLUENZA VIRUS VAC: HCPCS | Mod: PBBFAC

## 2017-10-02 PROCEDURE — 1126F AMNT PAIN NOTED NONE PRSNT: CPT | Mod: ,,, | Performed by: INTERNAL MEDICINE

## 2017-10-02 NOTE — PROGRESS NOTES
"Subjective:   Patient ID:  Arie Salinas is a 89 y.o. male who presents for follow-up of Hypertension      HPI: Doing well. No cardiac symptoms. Unsteady on his feet. Participates in the physical therapy.      Lab Results   Component Value Date     09/25/2017     09/25/2017    K 4.8 09/25/2017    K 4.8 09/25/2017     09/25/2017     09/25/2017    CO2 24 09/25/2017    CO2 24 09/25/2017    BUN 26 (H) 09/25/2017    BUN 26 (H) 09/25/2017    CREATININE 1.5 (H) 09/25/2017    CREATININE 1.5 (H) 09/25/2017    GLU 98 09/25/2017    GLU 98 09/25/2017    HGBA1C 6.1 06/30/2016    MG 2.1 12/07/2015    AST 25 09/25/2017    ALT 22 09/25/2017    ALBUMIN 3.0 (L) 09/25/2017    ALBUMIN 3.0 (L) 09/25/2017    PROT 6.1 09/25/2017    BILITOT 0.9 09/25/2017    WBC 8.54 02/22/2017    HGB 12.7 (L) 02/22/2017    HCT 38.5 (L) 02/22/2017    MCV 93 02/22/2017     02/22/2017    INR 1.0 02/22/2017    PSA 0.80 01/06/2012    TSH 1.285 09/25/2017    CHOL 115 (L) 09/25/2017    HDL 40 09/25/2017    LDLCALC 56.6 (L) 09/25/2017    TRIG 92 09/25/2017       Review of Systems   Constitution: Positive for weight loss.   HENT: Negative.    Eyes: Negative.    Cardiovascular: Positive for leg swelling. Negative for chest pain, claudication, dyspnea on exertion, irregular heartbeat, near-syncope, palpitations and syncope.   Respiratory: Negative.  Negative for cough, shortness of breath, snoring and wheezing.    Endocrine: Negative.  Negative for cold intolerance, heat intolerance, polydipsia, polyphagia and polyuria.   Skin: Negative.    Musculoskeletal: Positive for arthritis and back pain.   Gastrointestinal: Negative.    Genitourinary: Negative.    Neurological: Positive for loss of balance.   Psychiatric/Behavioral: Negative.        Objective:   Physical Exam   Constitutional: He is oriented to person, place, and time. He appears well-developed and well-nourished.   BP (!) 115/56   Pulse (!) 55   Ht 5' 7" (1.702 m)   " Wt 77.6 kg (171 lb 1.2 oz)   BMI 26.79 kg/m²      HENT:   Head: Normocephalic.   Eyes: Pupils are equal, round, and reactive to light.   Neck: Normal range of motion. Neck supple. No thyromegaly present.   Cardiovascular: Normal rate, regular rhythm and intact distal pulses.  Exam reveals no gallop and no friction rub.    Murmur heard.   Holosystolic murmur is present with a grade of 2/6  at the apex  Pulses:       Carotid pulses are 2+ on the right side with bruit, and 2+ on the left side.       Radial pulses are 2+ on the right side, and 2+ on the left side.        Femoral pulses are 2+ on the right side, and 2+ on the left side.       Popliteal pulses are 2+ on the right side, and 2+ on the left side.        Dorsalis pedis pulses are 2+ on the right side, and 2+ on the left side.        Posterior tibial pulses are 2+ on the right side, and 2+ on the left side.   Pulmonary/Chest: Effort normal and breath sounds normal. No respiratory distress. He has no wheezes. He has no rales. He exhibits no tenderness.   Abdominal: Soft. Bowel sounds are normal.   Musculoskeletal: Normal range of motion. He exhibits no edema.   Neurological: He is alert and oriented to person, place, and time.   Skin: Skin is warm and dry.   Psychiatric: He has a normal mood and affect.   Nursing note and vitals reviewed.        Assessment and Plan:     Problem List Items Addressed This Visit        Cardiology Problems    Hyperlipidemia (Chronic)    Relevant Orders    Comprehensive metabolic panel    Lipid panel    TSH    Coronary artery disease - Primary (Chronic)    Relevant Orders    Comprehensive metabolic panel    Lipid panel    TSH    Mitral valve regurgitation (Chronic)    Relevant Orders    Comprehensive metabolic panel    Lipid panel    TSH    Renal artery stenosis (Chronic)    Relevant Orders    Comprehensive metabolic panel    Lipid panel    TSH    PVC's (premature ventricular contractions) (Chronic)    Relevant Orders     Comprehensive metabolic panel    Lipid panel    TSH    PAD (peripheral artery disease)    Relevant Orders    Comprehensive metabolic panel    Lipid panel    TSH    Atrial fibrillation    Relevant Orders    Comprehensive metabolic panel    Lipid panel    TSH    Acute on chronic diastolic heart failure    Relevant Orders    Comprehensive metabolic panel    Lipid panel    TSH    Essential hypertension    Relevant Orders    Comprehensive metabolic panel    Lipid panel    TSH       Other    CKD (chronic kidney disease) stage 3, GFR 30-59 ml/min (Chronic)    Relevant Orders    Comprehensive metabolic panel    Lipid panel    TSH    History of renal stent    Relevant Orders    Comprehensive metabolic panel    Lipid panel    TSH    Right carotid bruit    Relevant Orders    CAR Ultrasound doppler carotid bliateral    Comprehensive metabolic panel    Lipid panel    TSH    On amiodarone therapy    Relevant Orders    Comprehensive metabolic panel    Lipid panel    TSH      Other Visit Diagnoses    None.         Patient's Medications   New Prescriptions    No medications on file   Previous Medications    ALLOPURINOL (ZYLOPRIM) 300 MG TABLET    TAKE 1 TABLET (300 MG TOTAL) BY MOUTH ONCE DAILY.    AMIODARONE (PACERONE) 200 MG TAB    TAKE 1 TABLET EVERY DAY    APIXABAN 2.5 MG TAB    Take 1 tablet (2.5 mg total) by mouth 2 (two) times daily.    ASPIRIN 81 MG TAB    Take 81 mg by mouth Daily.    ATORVASTATIN (LIPITOR) 20 MG TABLET    TAKE 1 TABLET ONE TIME DAILY    HYOSCYAMINE (LEVSIN/SL) 0.125 MG SUBL    Take 1 tablet (0.125 mg total) by mouth every 6 (six) hours as needed.    LISINOPRIL (PRINIVIL,ZESTRIL) 20 MG TABLET    Take 1 tablet (20 mg total) by mouth once daily.    METOPROLOL SUCCINATE (TOPROL-XL) 50 MG 24 HR TABLET    Take 1 tablet (50 mg total) by mouth once daily.    MULTIVITAMIN-MINERALS-LUTEIN (CENTRUM SILVER) TAB    Take 1 tablet by mouth Daily.    TAMSULOSIN (FLOMAX) 0.4 MG CP24    TAKE 1 CAPSULE EVERY DAY   Modified  Medications    No medications on file   Discontinued Medications    FUROSEMIDE (LASIX) 20 MG TABLET    Take 1 tablet (20 mg total) by mouth once daily.       Return in about 6 months (around 4/2/2018).

## 2017-10-05 ENCOUNTER — CLINICAL SUPPORT (OUTPATIENT)
Dept: CARDIOLOGY | Facility: CLINIC | Age: 82
End: 2017-10-05
Payer: MEDICARE

## 2017-10-05 DIAGNOSIS — R09.89 RIGHT CAROTID BRUIT: ICD-10-CM

## 2017-10-05 PROCEDURE — 93880 EXTRACRANIAL BILAT STUDY: CPT | Mod: PBBFAC,PO | Performed by: INTERNAL MEDICINE

## 2017-10-06 ENCOUNTER — TELEPHONE (OUTPATIENT)
Dept: CARDIOLOGY | Facility: CLINIC | Age: 82
End: 2017-10-06

## 2017-10-06 LAB — INTERNAL CAROTID STENOSIS: ABNORMAL

## 2017-10-06 NOTE — TELEPHONE ENCOUNTER
----- Message from Kerri Gonzalez MD sent at 10/6/2017  3:17 PM CDT -----  Mr. Salinas,  Please review results of your carotid duplex. It shows moderate bilateral blockages in the carotid vessels, but not significant enough for us to recommend any intervention at this time.  We will repeat it in 6 months

## 2017-10-18 ENCOUNTER — OFFICE VISIT (OUTPATIENT)
Dept: NEPHROLOGY | Facility: CLINIC | Age: 82
End: 2017-10-18
Payer: MEDICARE

## 2017-10-18 VITALS
WEIGHT: 168 LBS | HEART RATE: 59 BPM | BODY MASS INDEX: 26.37 KG/M2 | HEIGHT: 67 IN | SYSTOLIC BLOOD PRESSURE: 140 MMHG | DIASTOLIC BLOOD PRESSURE: 68 MMHG | OXYGEN SATURATION: 96 %

## 2017-10-18 DIAGNOSIS — I10 ESSENTIAL HYPERTENSION: Primary | ICD-10-CM

## 2017-10-18 DIAGNOSIS — E83.9 CHRONIC KIDNEY DISEASE-MINERAL AND BONE DISORDER: Chronic | ICD-10-CM

## 2017-10-18 DIAGNOSIS — N18.30 CKD (CHRONIC KIDNEY DISEASE) STAGE 3, GFR 30-59 ML/MIN: Chronic | ICD-10-CM

## 2017-10-18 DIAGNOSIS — N18.30 ANEMIA OF CHRONIC RENAL FAILURE, STAGE 3 (MODERATE): Chronic | ICD-10-CM

## 2017-10-18 DIAGNOSIS — M89.9 CHRONIC KIDNEY DISEASE-MINERAL AND BONE DISORDER: Chronic | ICD-10-CM

## 2017-10-18 DIAGNOSIS — M1A.0720 IDIOPATHIC CHRONIC GOUT OF LEFT ANKLE WITHOUT TOPHUS: ICD-10-CM

## 2017-10-18 DIAGNOSIS — I73.9 PAD (PERIPHERAL ARTERY DISEASE): ICD-10-CM

## 2017-10-18 DIAGNOSIS — N18.9 CHRONIC KIDNEY DISEASE-MINERAL AND BONE DISORDER: Chronic | ICD-10-CM

## 2017-10-18 DIAGNOSIS — D63.1 ANEMIA OF CHRONIC RENAL FAILURE, STAGE 3 (MODERATE): Chronic | ICD-10-CM

## 2017-10-18 DIAGNOSIS — E78.2 MIXED HYPERLIPIDEMIA: Chronic | ICD-10-CM

## 2017-10-18 PROCEDURE — 99214 OFFICE O/P EST MOD 30 MIN: CPT | Mod: PBBFAC | Performed by: HOSPITALIST

## 2017-10-18 PROCEDURE — 99999 PR PBB SHADOW E&M-EST. PATIENT-LVL IV: CPT | Mod: PBBFAC,GC,, | Performed by: HOSPITALIST

## 2017-10-18 PROCEDURE — 99213 OFFICE O/P EST LOW 20 MIN: CPT | Mod: S$PBB,GC,, | Performed by: HOSPITALIST

## 2017-10-18 NOTE — PATIENT INSTRUCTIONS
Chronic Kidney Disease (CKD)     The role of the kidneys is to remove waste products and extra water from the blood.  When the kidneys do not work as they should, waste products begin to build up in the blood. This is called chronic kidney disease (CKD). CKD means that you have kidney damage or a decrease in kidney function lasting at least 3 months. CKD allows extra water, waste, and toxins to build up in the body. This can eventually become life-threatening. You might need dialysis or a kidney transplant to stay alive. This most severe form is called end stage renal disease.  Diabetes is the leading causes of chronic renal failure. Other causes include high blood pressure, hardening of the arteries (atherosclerosis), lupus, inflammation of the blood vessels (vasculitis), and past viral or bacterial infections. Certain over-the-counter pain medicines can cause renal failure when taken often over a long period of time. These include aspirin, ibuprofen, and related anti-inflammatory medicines called NSAIDs (nonsteroidal anti-inflammatory drugs).  Home care  The following guidelines will help you care for yourself at home:  · If you have diabetes, talk with your healthcare provider about keeping your blood sugar under control. Ask if you need to make and changes to your diet, lifestyle, or medicines.  · If you have high blood pressure:  ¨ Take prescribed medicine to lower your blood pressure to the recommended goal of less than 130/80.  ¨ Start a regular exercise program that you enjoy. Check with your healthcare provider to be sure your planned exercise program is right for you.  ¨ Eat less salt (sodium). Your healthcare provider can tell you how much salt per day is safe for you.  · If you are overweight, talk with your healthcare provider about a weight loss plan.  · If you smoke, you must quit. Smoking makes kidney disease worse. Talk with your healthcare provider about ways to help you quit.  For more  information, visit the following links:  ¨ www.smokefree.gov/sites/default/files/pdf/clearing-the-air-accessible.pdf  ¨ www.smokefree.gov  ¨ www.cancer.org/healthy/stayawayfromtobacco/guidetoquittingsmoking/  · Most people with CKD need to follow a special diet.  Be sure you understand yours. In general, you will need to limit protein, salt, potassium, and phosphorus. You also need to limit how much fluid you drink.   · CKD is a risk factor for heart disease. Talk with your healthcare provider about any other risk factors you might have and what you can do to lessen them.  · Talk with your healthcare provider about any medicines you are taking to find out if they need to be reduced or stopped.  · Don't use the following over-the-counter medicines, or consult your healthcare provider before using:  ¨ Aspirin and NSAIDs such as ibuprofen or naproxen. Using acetaminophen for fever or pain is OK.  ¨ Laxatives and antacids containing magnesium or aluminum  ¨ Fleet or phospho soda enemas containing phosphorus  ¨ Certain stomach acid-blocking medicine such as cimetidine or ranitidine   ¨ Decongestants containing pseudoephedrine   ¨ Herbal supplements  Follow-up care  Follow up with your healthcare provider, or as advised. Contact one of the following for more information:  · American Association of Kidney Patients 481-641-0797 www.aakp.org  · National Kidney Foundation 598-957-2494 www.kidney.org  · American Kidney Fund 925-705-8392 www.kidneyfund.org  · National Kidney Disease Education Program 866-4KIDNEY www.nkdep.nih.gov  If an X-ray, ECG (cardiogram), or other diagnostic test was taken, you will be told of any new findings that may affect your care.  Call 911  Call 911 if you have any of the following:  · Severe weakness, dizziness, fainting, drowsiness, or confusion  · Chest pain or shortness of breath  · Heart beating fast, slow, or irregularly  When to seek medical advice  Call your healthcare provider right away  if any of these occur:  · Nausea or vomiting  · Fever of 100.4°F (38°C) or higher, or as directed by your healthcare provider  · Unexpected weight gain or swelling in the legs, ankles, or around the eyes  · Decrease or absent urine output  Date Last Reviewed: 9/1/2016  © 2002-9265 [employer]. [address] [place] [address], [place] [address]. All rights reserved. This information is not intended as a substitute for professional medical care. Always follow your healthcare professional's instructions.      Discharge Instructions: Eating a Low-Salt Diet  Your health care provider has prescribed a low-salt diet for you. Most people with heart problems need to eat less salt, which is full of sodium. Too much sodium is linked to high blood pressure, which is linked to a greater risk of heart disease, stroke, blindness, and kidney problems.  Home care    Learn ways to cut back on salt (sodium):  · Eat less frozen, canned, dried, packaged, and fast foods. These often contain high amounts of sodium.  · Season foods with herbs instead of salt when you cook.  · Season with flavorings such as pepper, lemon, garlic, and onion.  · Dont add salt to your food at the table.  · Sprinkle salt-free herbal blends on meats and vegetables.  Learn to read food labels carefully:  · Look for the total amount of sodium per serving.  · Look for foods labeled low sodium, reduced sodium, or no added salt.  · Beware. Salt goes by many names. Cut down on foods with these words (all forms of salt) listed as ingredients:  ¨ Salt  ¨ Sodium  ¨ Soy sauce  ¨ Baking soda  ¨ Baking powder  ¨ MSG  ¨ Monosodium  ¨ Na (the chemical symbol for sodium)  Other ideas:  · Use more fresh food. Buy more fruits and vegetables.  · Select lean meats, fish, and poultry.  · Find a cookbook with low-salt recipes. Youll find ideas for tasty meals that are healthy for your heart.  ·   When eating out, ask questions about the menu. Tell the  you're on a  low-salt diet.  ¨ If you order fish, chicken, beef, or pork, ask the  to have it broiled, baked, poached, or grilled without salt, butter, or breading.  ¨ Choose plain steamed rice, boiled noodles, and baked or boiled potatoes. Top potatoes with chives and a little sour cream instead of butter.  · Avoid antacids that are high in salt. Check the label before you buy.  Follow-up  Make a follow-up appointment with a nutritionist as directed by our staff.  Date Last Reviewed: 6/20/2015  © 0878-3318 [employer]. [address] [place] [address], [place] [address]. All rights reserved. This information is not intended as a substitute for professional medical care. Always follow your healthcare professional's instructions.    Avoid Aleve, Motrin, Ibuprofen and other products containing NSAIDs (non-steroidal anti-inflammatories).

## 2017-10-23 PROBLEM — E83.9 CHRONIC KIDNEY DISEASE-MINERAL AND BONE DISORDER: Chronic | Status: ACTIVE | Noted: 2017-10-23

## 2017-10-23 PROBLEM — M89.9 CHRONIC KIDNEY DISEASE-MINERAL AND BONE DISORDER: Chronic | Status: ACTIVE | Noted: 2017-10-23

## 2017-10-23 PROBLEM — N18.9 CHRONIC KIDNEY DISEASE-MINERAL AND BONE DISORDER: Chronic | Status: ACTIVE | Noted: 2017-10-23

## 2017-10-23 NOTE — PROGRESS NOTES
" Patient ID: Arie Salinas is a 89 y.o. White male who presents for follow-up evaluation of Chronic Kidney Disease and Hypertension  .    Interval History:Mr Arie Salinas is a very pleasant 89 y.o. White male who was followed by Nephrology Dr. Bullock. He presents today for Clinic as follow up for CKD. He has been doing well with no recent hospitalizations and is here with his wife. Recently seen by his Cardiologist Dr. Gonzalez for his AFib. He remains active and is still driving. He denies SOB, WAHL, Edema,Chest pain or palpitations and N/V. He denies any NSAIDs use and is compliant with low salt diet.     HPI  As per Dr. Bullock, "87 yo M with PMHx of HTN, renal artery stent with no stenosis on recent renal artery US, PAFIB on apixiban, CAD, CKD 3, Mitral regurgitation, PAD, HLP.   Mr Salinas does not have any complaints. He recently took one week of daily lasix 20 mg per his cardiologist. He does have trace LE edema but he wears support socks three times per week that he is very happy with.   His renal panel and creatinine have been stable with a GFR of 40. BP is well controlled at 124/64, 62. "     Review of Systems    Constitutional: Negative for chills, diaphoresis, fever and weight loss.   HENT: Negative for nosebleeds and tinnitus.    Eyes: Negative for blurred vision, double vision and photophobia.   Respiratory: Negative for cough and shortness of breath.    Cardiovascular: Positive for LE swelling, Negative for chest pain, palpitations, orthopnea and PND.   Gastrointestinal: Negative for abdominal pain, diarrhea, constipation nausea and vomiting.   Genitourinary: Negative for dysuria, flank pain, frequency, hematuria and urgency.   Musculoskeletal: Positive for back pain and arthralgias, Negative for  falls, myalgias and neck pain.   Skin: Negative.    Neurological: Negative for dizziness, tingling, tremors, sensory change, speech change, focal weakness, seizures, loss of " consciousness, weakness and headaches.   Endo/Heme/Allergies: Negative for environmental allergies and polydipsia. Does not bruise/bleed easily.   Psychiatric/Behavioral: Negative for depression, hallucinations, memory loss, substance abuse and suicidal ideas. The patient is not nervous/anxious and does not have insomnia.        Objective:      Physical Exam    Constitutional:  well-developed and well-nourished. No distress.   HENT:   Head: Normocephalic and atraumatic.   Neck: Normal range of motion. Neck supple.   Cardiovascular: Normal rate, regular rhythm, normal heart sounds and intact distal pulses.  Exam reveals no gallop and no friction rub.    Positive murmur heard. Holosystolic 2/6 at PMI  Pulmonary/Chest: Effort normal and breath sounds normal. No respiratory distress. no wheezes, no rales, no tenderness.   Abdominal: Soft. Bowel sounds are normal, no distension. There is no tenderness. There is no rebound and no guarding.   Musculoskeletal: Normal range of motion. No edema or deformity.   Neurological: Awake alert and oriented x 4. Motor strength preserved 5/5. DTR symmetrical.  Skin: Skin is warm and dry. No rash noted. She is not diaphoretic. No erythema. No pallor.       Assessment:       No diagnosis found.     Plan:     1. CKD stage 3: most likely seondary to his age, HTN, MR, PAD and HLD. sCr seems stable at baselin 1.5.     Lab Results   Component Value Date    CREATININE 1.5 (H) 09/25/2017    CREATININE 1.5 (H) 09/25/2017     Protein Creatinine Ratios:    Prot/Creat Ratio, Ur   Date Value Ref Range Status   09/25/2017 0.10 0.00 - 0.20 Final   08/15/2016 0.11 0.00 - 0.20 Final   07/19/2016 0.09 0.00 - 0.20 Final   Negative for proteinuria  · Will check UPRC    Acid-Base: CO2 at goal  Lab Results   Component Value Date     09/25/2017     09/25/2017    K 4.8 09/25/2017    K 4.8 09/25/2017    CO2 24 09/25/2017    CO2 24 09/25/2017     2. HTN: Blood pressures   · Well control BP in last 6  mo, will continue to monitor. Goal for BP is <130 mmHg SBP and BDP <85 mmHg.   · Continue current regiment.      3. Renal Mineral Bone disease: last PTH 46  Lab Results   Component Value Date    PTH 46.0 08/15/2016    CALCIUM 8.9 09/25/2017    CALCIUM 8.9 09/25/2017    PHOS 3.3 09/25/2017   · Will check PTHi and Vit D    4. Anemia: At goal  Lab Results   Component Value Date    HGB 12.7 (L) 02/22/2017      5. Lipid management: Cont statin  Lab Results   Component Value Date    LDLCALC 56.6 (L) 09/25/2017       5. HyperUricemia : Contorl with allopurinol     Follow up in 6 mo with labs  Hector Danielle MD

## 2017-10-23 NOTE — PROGRESS NOTES
[facility]    The note from the fellow/resident was reviewed. I have personally interviewed and examined the patient. There were no additional findings with regards to the history or physical exam.    I agree with the assessment and plan of Dr. Danielle.

## 2017-11-13 ENCOUNTER — OFFICE VISIT (OUTPATIENT)
Dept: DERMATOLOGY | Facility: CLINIC | Age: 82
End: 2017-11-13
Payer: MEDICARE

## 2017-11-13 DIAGNOSIS — Z85.828 HISTORY OF NONMELANOMA SKIN CANCER: ICD-10-CM

## 2017-11-13 DIAGNOSIS — L82.1 SEBORRHEIC KERATOSES: ICD-10-CM

## 2017-11-13 DIAGNOSIS — L57.0 ACTINIC KERATOSIS: Primary | ICD-10-CM

## 2017-11-13 DIAGNOSIS — D18.00 ANGIOMA: ICD-10-CM

## 2017-11-13 PROCEDURE — 99213 OFFICE O/P EST LOW 20 MIN: CPT | Mod: 25,S$PBB,, | Performed by: DERMATOLOGY

## 2017-11-13 PROCEDURE — 17003 DESTRUCT PREMALG LES 2-14: CPT | Mod: PBBFAC | Performed by: DERMATOLOGY

## 2017-11-13 PROCEDURE — 17000 DESTRUCT PREMALG LESION: CPT | Mod: PBBFAC | Performed by: DERMATOLOGY

## 2017-11-13 PROCEDURE — 99212 OFFICE O/P EST SF 10 MIN: CPT | Mod: PBBFAC | Performed by: DERMATOLOGY

## 2017-11-13 PROCEDURE — 17000 DESTRUCT PREMALG LESION: CPT | Mod: S$PBB,,, | Performed by: DERMATOLOGY

## 2017-11-13 PROCEDURE — 17003 DESTRUCT PREMALG LES 2-14: CPT | Mod: S$PBB,,, | Performed by: DERMATOLOGY

## 2017-11-13 PROCEDURE — 99999 PR PBB SHADOW E&M-EST. PATIENT-LVL II: CPT | Mod: PBBFAC,,, | Performed by: DERMATOLOGY

## 2017-11-13 NOTE — PROGRESS NOTES
Subjective:       Patient ID:  Arie Salinas is a 89 y.o. male who presents for   Chief Complaint   Patient presents with    Spot     Scalp x 1 year; Left side of face x 3 weeks, scaly     History of Present Illness: The patient presents for follow up of skin check.    The patient was last seen on: 5/2016 by [facility] for cryosurgery to actinic keratoses which have resolved.   This is a high risk patient here to check for the development of new lesions.    Other skin complaints: lesion on scalp x 1 year no sx and no tx  C/o lesion on left cheek x 3 weeks + scaling and itchy no tx          Review of Systems   Skin: Positive for activity-related sunscreen use and wears hat (5%). Negative for daily sunscreen use and recent sunburn.   Hematologic/Lymphatic: Bruises/bleeds easily (on eloquis).        Objective:    Physical Exam   Constitutional: He appears well-developed and well-nourished. No distress.   Neurological: He is alert and oriented to person, place, and time. He is not disoriented.   Psychiatric: He has a normal mood and affect.   Skin:   Areas Examined (abnormalities noted in diagram):   Scalp / Hair Palpated and Inspected  Head / Face Inspection Performed  Neck Inspection Performed  Chest / Axilla Inspection Performed  Abdomen Inspection Performed  Back Inspection Performed  RUE Inspected  LUE Inspection Performed                   Diagram Legend     Erythematous scaling macule/papule c/w actinic keratosis       Vascular papule c/w angioma      Pigmented verrucoid papule/plaque c/w seborrheic keratosis      Yellow umbilicated papule c/w sebaceous hyperplasia      Irregularly shaped tan macule c/w lentigo     1-2 mm smooth white papules consistent with Milia      Movable subcutaneous cyst with punctum c/w epidermal inclusion cyst      Subcutaneous movable cyst c/w pilar cyst      Firm pink to brown papule c/w dermatofibroma      Pedunculated fleshy papule(s) c/w skin tag(s)      Evenly pigmented macule  c/w junctional nevus     Mildly variegated pigmented, slightly irregular-bordered macule c/w mildly atypical nevus      Flesh colored to evenly pigmented papule c/w intradermal nevus       Pink pearly papule/plaque c/w basal cell carcinoma      Erythematous hyperkeratotic cursted plaque c/w SCC      Surgical scar with no sign of skin cancer recurrence      Open and closed comedones      Inflammatory papules and pustules      Verrucoid papule consistent consistent with wart     Erythematous eczematous patches and plaques     Dystrophic onycholytic nail with subungual debris c/w onychomycosis     Umbilicated papule    Erythematous-base heme-crusted tan verrucoid plaque consistent with inflamed seborrheic keratosis     Erythematous Silvery Scaling Plaque c/w Psoriasis     See annotation      Assessment / Plan:        Actinic keratosis  Cryosurgery Procedure Note    Verbal consent from the patient is obtained and the patient is aware of the precancerous quality and need for treatment of these lesions. Liquid nitrogen cryosurgery is applied to the 7 actinic keratoses, as detailed in the physical exam, to produce a freeze injury. The patient is aware that blisters may form and is instructed on wound care with gentle cleansing and use of vaseline ointment to keep moist until healed. The patient is supplied a handout on cryosurgery and is instructed to call if lesions do not completely resolve.    Wear hat always    Seborrheic keratoses  These are benign inherited growths without a malignant potential. Reassurance given to patient. No treatment is necessary.       History of nonmelanoma skin cancer  Area(s) of previous NMSC evaluated with no signs of recurrence.    Upper body skin examination performed today including at least 6 points as noted in physical examination. No lesions suspicious for malignancy noted.      Angiomas  This is a benign vascular lesion. Reassurance given. No treatment required.                Return  in about 3 months (around 2/13/2018).

## 2017-11-13 NOTE — PATIENT INSTRUCTIONS

## 2017-11-14 DIAGNOSIS — I48.0 PAROXYSMAL ATRIAL FIBRILLATION: ICD-10-CM

## 2017-11-14 RX ORDER — APIXABAN 2.5 MG/1
TABLET, FILM COATED ORAL
Qty: 180 TABLET | Refills: 3 | Status: SHIPPED | OUTPATIENT
Start: 2017-11-14 | End: 2018-03-07 | Stop reason: SDUPTHER

## 2017-12-19 DIAGNOSIS — N40.0 PROSTATISM: ICD-10-CM

## 2017-12-19 DIAGNOSIS — I10 HYPERTENSION, ESSENTIAL: ICD-10-CM

## 2017-12-19 RX ORDER — METOPROLOL SUCCINATE 50 MG/1
TABLET, EXTENDED RELEASE ORAL
Qty: 90 TABLET | Refills: 3 | Status: SHIPPED | OUTPATIENT
Start: 2017-12-19 | End: 2018-01-10 | Stop reason: SDUPTHER

## 2017-12-20 RX ORDER — TAMSULOSIN HYDROCHLORIDE 0.4 MG/1
CAPSULE ORAL
Qty: 90 CAPSULE | Refills: 0 | Status: SHIPPED | OUTPATIENT
Start: 2017-12-20 | End: 2018-03-23 | Stop reason: SDUPTHER

## 2018-01-01 ENCOUNTER — HOSPITAL ENCOUNTER (OUTPATIENT)
Facility: HOSPITAL | Age: 83
Discharge: HOME HEALTH | End: 2018-01-02
Attending: EMERGENCY MEDICINE | Admitting: EMERGENCY MEDICINE
Payer: MEDICARE

## 2018-01-01 DIAGNOSIS — A09 DIARRHEA, INFECTIOUS, ADULT: Primary | ICD-10-CM

## 2018-01-01 LAB
ALBUMIN SERPL BCP-MCNC: 3.3 G/DL
ALP SERPL-CCNC: 103 U/L
ALT SERPL W/O P-5'-P-CCNC: 48 U/L
ANION GAP SERPL CALC-SCNC: 10 MMOL/L
ANISOCYTOSIS BLD QL SMEAR: SLIGHT
AST SERPL-CCNC: 43 U/L
BASOPHILS # BLD AUTO: 0.04 K/UL
BASOPHILS NFR BLD: 0.2 %
BILIRUB SERPL-MCNC: 0.8 MG/DL
BUN SERPL-MCNC: 38 MG/DL
BURR CELLS BLD QL SMEAR: ABNORMAL
CALCIUM SERPL-MCNC: 9.2 MG/DL
CHLORIDE SERPL-SCNC: 107 MMOL/L
CO2 SERPL-SCNC: 23 MMOL/L
CREAT SERPL-MCNC: 1.9 MG/DL
DIFFERENTIAL METHOD: ABNORMAL
EOSINOPHIL # BLD AUTO: 0 K/UL
EOSINOPHIL NFR BLD: 0.1 %
ERYTHROCYTE [DISTWIDTH] IN BLOOD BY AUTOMATED COUNT: 14.7 %
EST. GFR  (AFRICAN AMERICAN): 35.1 ML/MIN/1.73 M^2
EST. GFR  (NON AFRICAN AMERICAN): 30.4 ML/MIN/1.73 M^2
GLUCOSE SERPL-MCNC: 174 MG/DL
HCT VFR BLD AUTO: 40.5 %
HGB BLD-MCNC: 13.4 G/DL
IMM GRANULOCYTES # BLD AUTO: 0.16 K/UL
IMM GRANULOCYTES NFR BLD AUTO: 0.7 %
LIPASE SERPL-CCNC: 32 U/L
LYMPHOCYTES # BLD AUTO: 0.2 K/UL
LYMPHOCYTES NFR BLD: 0.9 %
MCH RBC QN AUTO: 30.2 PG
MCHC RBC AUTO-ENTMCNC: 33.1 G/DL
MCV RBC AUTO: 91 FL
MONOCYTES # BLD AUTO: 1.2 K/UL
MONOCYTES NFR BLD: 5.5 %
NEUTROPHILS # BLD AUTO: 20.4 K/UL
NEUTROPHILS NFR BLD: 92.6 %
NRBC BLD-RTO: 0 /100 WBC
PLATELET # BLD AUTO: 241 K/UL
PMV BLD AUTO: 11 FL
POIKILOCYTOSIS BLD QL SMEAR: SLIGHT
POTASSIUM SERPL-SCNC: 5.1 MMOL/L
PROT SERPL-MCNC: 7.1 G/DL
RBC # BLD AUTO: 4.43 M/UL
SODIUM SERPL-SCNC: 140 MMOL/L
WBC # BLD AUTO: 22.08 K/UL

## 2018-01-01 PROCEDURE — 99285 EMERGENCY DEPT VISIT HI MDM: CPT | Mod: 25

## 2018-01-01 PROCEDURE — 96365 THER/PROPH/DIAG IV INF INIT: CPT

## 2018-01-01 PROCEDURE — 63600175 PHARM REV CODE 636 W HCPCS: Performed by: EMERGENCY MEDICINE

## 2018-01-01 PROCEDURE — 83690 ASSAY OF LIPASE: CPT

## 2018-01-01 PROCEDURE — 80053 COMPREHEN METABOLIC PANEL: CPT

## 2018-01-01 PROCEDURE — 25000003 PHARM REV CODE 250: Performed by: EMERGENCY MEDICINE

## 2018-01-01 PROCEDURE — 96366 THER/PROPH/DIAG IV INF ADDON: CPT

## 2018-01-01 PROCEDURE — 96361 HYDRATE IV INFUSION ADD-ON: CPT

## 2018-01-01 PROCEDURE — 99284 EMERGENCY DEPT VISIT MOD MDM: CPT | Mod: ,,, | Performed by: EMERGENCY MEDICINE

## 2018-01-01 PROCEDURE — 96375 TX/PRO/DX INJ NEW DRUG ADDON: CPT

## 2018-01-01 PROCEDURE — 85025 COMPLETE CBC W/AUTO DIFF WBC: CPT

## 2018-01-01 RX ORDER — ONDANSETRON 4 MG/1
4 TABLET, ORALLY DISINTEGRATING ORAL
Status: DISCONTINUED | OUTPATIENT
Start: 2018-01-01 | End: 2018-01-02

## 2018-01-01 RX ORDER — METRONIDAZOLE 500 MG/100ML
500 INJECTION, SOLUTION INTRAVENOUS
Status: COMPLETED | OUTPATIENT
Start: 2018-01-02 | End: 2018-01-02

## 2018-01-01 RX ORDER — ONDANSETRON 2 MG/ML
4 INJECTION INTRAMUSCULAR; INTRAVENOUS
Status: COMPLETED | OUTPATIENT
Start: 2018-01-01 | End: 2018-01-01

## 2018-01-01 RX ADMIN — SODIUM CHLORIDE 1000 ML: 0.9 INJECTION, SOLUTION INTRAVENOUS at 10:01

## 2018-01-01 RX ADMIN — ONDANSETRON 4 MG: 2 INJECTION INTRAMUSCULAR; INTRAVENOUS at 10:01

## 2018-01-02 VITALS
HEIGHT: 70 IN | RESPIRATION RATE: 16 BRPM | HEART RATE: 61 BPM | BODY MASS INDEX: 22.54 KG/M2 | WEIGHT: 157.44 LBS | SYSTOLIC BLOOD PRESSURE: 95 MMHG | DIASTOLIC BLOOD PRESSURE: 55 MMHG | TEMPERATURE: 98 F | OXYGEN SATURATION: 96 %

## 2018-01-02 PROBLEM — I51.89 DIASTOLIC DYSFUNCTION: Status: ACTIVE | Noted: 2018-01-02

## 2018-01-02 PROBLEM — R11.2 NAUSEA & VOMITING: Status: ACTIVE | Noted: 2018-01-02

## 2018-01-02 PROBLEM — R19.7 DIARRHEA: Status: ACTIVE | Noted: 2018-01-02

## 2018-01-02 LAB
ALBUMIN SERPL BCP-MCNC: 2.5 G/DL
ALP SERPL-CCNC: 79 U/L
ALT SERPL W/O P-5'-P-CCNC: 34 U/L
ANION GAP SERPL CALC-SCNC: 4 MMOL/L
ANION GAP SERPL CALC-SCNC: 4 MMOL/L
AST SERPL-CCNC: 29 U/L
BASOPHILS # BLD AUTO: 0.02 K/UL
BASOPHILS NFR BLD: 0.2 %
BILIRUB SERPL-MCNC: 0.6 MG/DL
BILIRUB UR QL STRIP: NEGATIVE
BUN SERPL-MCNC: 33 MG/DL
BUN SERPL-MCNC: 34 MG/DL
CALCIUM SERPL-MCNC: 8.1 MG/DL
CALCIUM SERPL-MCNC: 8.2 MG/DL
CHLORIDE SERPL-SCNC: 111 MMOL/L
CHLORIDE SERPL-SCNC: 111 MMOL/L
CLARITY UR REFRACT.AUTO: ABNORMAL
CO2 SERPL-SCNC: 24 MMOL/L
CO2 SERPL-SCNC: 25 MMOL/L
COLOR UR AUTO: YELLOW
CREAT SERPL-MCNC: 1.4 MG/DL
CREAT SERPL-MCNC: 1.5 MG/DL
DIFFERENTIAL METHOD: ABNORMAL
EOSINOPHIL # BLD AUTO: 0.1 K/UL
EOSINOPHIL NFR BLD: 0.4 %
ERYTHROCYTE [DISTWIDTH] IN BLOOD BY AUTOMATED COUNT: 14.9 %
EST. GFR  (AFRICAN AMERICAN): 46.7 ML/MIN/1.73 M^2
EST. GFR  (AFRICAN AMERICAN): 50.8 ML/MIN/1.73 M^2
EST. GFR  (NON AFRICAN AMERICAN): 40.4 ML/MIN/1.73 M^2
EST. GFR  (NON AFRICAN AMERICAN): 43.9 ML/MIN/1.73 M^2
GLUCOSE SERPL-MCNC: 102 MG/DL
GLUCOSE SERPL-MCNC: 116 MG/DL
GLUCOSE UR QL STRIP: NEGATIVE
HCT VFR BLD AUTO: 34.1 %
HGB BLD-MCNC: 11.2 G/DL
HGB UR QL STRIP: NEGATIVE
IMM GRANULOCYTES # BLD AUTO: 0.06 K/UL
IMM GRANULOCYTES NFR BLD AUTO: 0.5 %
KETONES UR QL STRIP: NEGATIVE
LEUKOCYTE ESTERASE UR QL STRIP: NEGATIVE
LYMPHOCYTES # BLD AUTO: 0.7 K/UL
LYMPHOCYTES NFR BLD: 5 %
MAGNESIUM SERPL-MCNC: 1.8 MG/DL
MCH RBC QN AUTO: 29.5 PG
MCHC RBC AUTO-ENTMCNC: 32.8 G/DL
MCV RBC AUTO: 90 FL
MONOCYTES # BLD AUTO: 1.5 K/UL
MONOCYTES NFR BLD: 11.1 %
NEUTROPHILS # BLD AUTO: 10.9 K/UL
NEUTROPHILS NFR BLD: 82.8 %
NITRITE UR QL STRIP: NEGATIVE
NRBC BLD-RTO: 0 /100 WBC
PH UR STRIP: 5 [PH] (ref 5–8)
PHOSPHATE SERPL-MCNC: 3.2 MG/DL
PLATELET # BLD AUTO: 202 K/UL
PMV BLD AUTO: 11.2 FL
POTASSIUM SERPL-SCNC: 5.1 MMOL/L
POTASSIUM SERPL-SCNC: 5.4 MMOL/L
PROT SERPL-MCNC: 5.4 G/DL
PROT UR QL STRIP: NEGATIVE
RBC # BLD AUTO: 3.8 M/UL
SODIUM SERPL-SCNC: 139 MMOL/L
SODIUM SERPL-SCNC: 140 MMOL/L
SP GR UR STRIP: 1.02 (ref 1–1.03)
TSH SERPL DL<=0.005 MIU/L-ACNC: 0.6 UIU/ML
URN SPEC COLLECT METH UR: ABNORMAL
UROBILINOGEN UR STRIP-ACNC: NEGATIVE EU/DL
WBC # BLD AUTO: 13.09 K/UL

## 2018-01-02 PROCEDURE — 25000003 PHARM REV CODE 250: Performed by: EMERGENCY MEDICINE

## 2018-01-02 PROCEDURE — 80048 BASIC METABOLIC PNL TOTAL CA: CPT

## 2018-01-02 PROCEDURE — S0030 INJECTION, METRONIDAZOLE: HCPCS | Performed by: EMERGENCY MEDICINE

## 2018-01-02 PROCEDURE — 84443 ASSAY THYROID STIM HORMONE: CPT

## 2018-01-02 PROCEDURE — 84100 ASSAY OF PHOSPHORUS: CPT

## 2018-01-02 PROCEDURE — G0378 HOSPITAL OBSERVATION PER HR: HCPCS

## 2018-01-02 PROCEDURE — S0030 INJECTION, METRONIDAZOLE: HCPCS | Performed by: NURSE PRACTITIONER

## 2018-01-02 PROCEDURE — 83735 ASSAY OF MAGNESIUM: CPT

## 2018-01-02 PROCEDURE — 99220 PR INITIAL OBSERVATION CARE,LEVL III: CPT | Mod: ,,, | Performed by: NURSE PRACTITIONER

## 2018-01-02 PROCEDURE — 81003 URINALYSIS AUTO W/O SCOPE: CPT

## 2018-01-02 PROCEDURE — 85025 COMPLETE CBC W/AUTO DIFF WBC: CPT

## 2018-01-02 PROCEDURE — 36415 COLL VENOUS BLD VENIPUNCTURE: CPT

## 2018-01-02 PROCEDURE — 25000003 PHARM REV CODE 250: Performed by: NURSE PRACTITIONER

## 2018-01-02 PROCEDURE — 80053 COMPREHEN METABOLIC PANEL: CPT

## 2018-01-02 RX ORDER — ALLOPURINOL 300 MG/1
300 TABLET ORAL DAILY
Status: DISCONTINUED | OUTPATIENT
Start: 2018-01-02 | End: 2018-01-02 | Stop reason: HOSPADM

## 2018-01-02 RX ORDER — GLUCAGON 1 MG
1 KIT INJECTION
Status: DISCONTINUED | OUTPATIENT
Start: 2018-01-02 | End: 2018-01-02 | Stop reason: HOSPADM

## 2018-01-02 RX ORDER — METRONIDAZOLE 500 MG/100ML
500 INJECTION, SOLUTION INTRAVENOUS
Status: DISCONTINUED | OUTPATIENT
Start: 2018-01-02 | End: 2018-01-02 | Stop reason: HOSPADM

## 2018-01-02 RX ORDER — SODIUM CHLORIDE 9 MG/ML
INJECTION, SOLUTION INTRAVENOUS CONTINUOUS
Status: ACTIVE | OUTPATIENT
Start: 2018-01-02 | End: 2018-01-02

## 2018-01-02 RX ORDER — ONDANSETRON 2 MG/ML
4 INJECTION INTRAMUSCULAR; INTRAVENOUS EVERY 8 HOURS PRN
Status: DISCONTINUED | OUTPATIENT
Start: 2018-01-02 | End: 2018-01-02 | Stop reason: HOSPADM

## 2018-01-02 RX ORDER — SODIUM CHLORIDE 0.9 % (FLUSH) 0.9 %
5 SYRINGE (ML) INJECTION
Status: DISCONTINUED | OUTPATIENT
Start: 2018-01-02 | End: 2018-01-02 | Stop reason: HOSPADM

## 2018-01-02 RX ORDER — NAPROXEN SODIUM 220 MG/1
81 TABLET, FILM COATED ORAL DAILY
Status: DISCONTINUED | OUTPATIENT
Start: 2018-01-02 | End: 2018-01-02 | Stop reason: HOSPADM

## 2018-01-02 RX ORDER — ATORVASTATIN CALCIUM 20 MG/1
20 TABLET, FILM COATED ORAL DAILY
Status: DISCONTINUED | OUTPATIENT
Start: 2018-01-02 | End: 2018-01-02 | Stop reason: HOSPADM

## 2018-01-02 RX ORDER — METOPROLOL SUCCINATE 50 MG/1
50 TABLET, EXTENDED RELEASE ORAL DAILY
Status: DISCONTINUED | OUTPATIENT
Start: 2018-01-02 | End: 2018-01-02 | Stop reason: HOSPADM

## 2018-01-02 RX ORDER — IBUPROFEN 200 MG
24 TABLET ORAL
Status: DISCONTINUED | OUTPATIENT
Start: 2018-01-02 | End: 2018-01-02 | Stop reason: HOSPADM

## 2018-01-02 RX ORDER — METRONIDAZOLE 500 MG/1
500 TABLET ORAL 3 TIMES DAILY
Qty: 30 TABLET | Refills: 0 | Status: SHIPPED | OUTPATIENT
Start: 2018-01-02 | End: 2018-01-12

## 2018-01-02 RX ORDER — AMIODARONE HYDROCHLORIDE 200 MG/1
200 TABLET ORAL DAILY
Status: DISCONTINUED | OUTPATIENT
Start: 2018-01-02 | End: 2018-01-02 | Stop reason: HOSPADM

## 2018-01-02 RX ORDER — IPRATROPIUM BROMIDE AND ALBUTEROL SULFATE 2.5; .5 MG/3ML; MG/3ML
3 SOLUTION RESPIRATORY (INHALATION) EVERY 4 HOURS PRN
Status: DISCONTINUED | OUTPATIENT
Start: 2018-01-02 | End: 2018-01-02 | Stop reason: HOSPADM

## 2018-01-02 RX ORDER — IBUPROFEN 200 MG
16 TABLET ORAL
Status: DISCONTINUED | OUTPATIENT
Start: 2018-01-02 | End: 2018-01-02 | Stop reason: HOSPADM

## 2018-01-02 RX ORDER — TAMSULOSIN HYDROCHLORIDE 0.4 MG/1
1 CAPSULE ORAL DAILY
Status: DISCONTINUED | OUTPATIENT
Start: 2018-01-02 | End: 2018-01-02 | Stop reason: HOSPADM

## 2018-01-02 RX ORDER — ONDANSETRON 8 MG/1
8 TABLET, ORALLY DISINTEGRATING ORAL EVERY 8 HOURS PRN
Status: DISCONTINUED | OUTPATIENT
Start: 2018-01-02 | End: 2018-01-02 | Stop reason: HOSPADM

## 2018-01-02 RX ORDER — LISINOPRIL 20 MG/1
20 TABLET ORAL DAILY
Status: DISCONTINUED | OUTPATIENT
Start: 2018-01-02 | End: 2018-01-02 | Stop reason: HOSPADM

## 2018-01-02 RX ORDER — ACETAMINOPHEN 325 MG/1
650 TABLET ORAL EVERY 6 HOURS PRN
Status: DISCONTINUED | OUTPATIENT
Start: 2018-01-02 | End: 2018-01-02 | Stop reason: HOSPADM

## 2018-01-02 RX ADMIN — TAMSULOSIN HYDROCHLORIDE 0.4 MG: 0.4 CAPSULE ORAL at 08:01

## 2018-01-02 RX ADMIN — ATORVASTATIN CALCIUM 20 MG: 20 TABLET, FILM COATED ORAL at 08:01

## 2018-01-02 RX ADMIN — AMIODARONE HYDROCHLORIDE 200 MG: 200 TABLET ORAL at 08:01

## 2018-01-02 RX ADMIN — METRONIDAZOLE 500 MG: 500 INJECTION, SOLUTION INTRAVENOUS at 08:01

## 2018-01-02 RX ADMIN — SODIUM CHLORIDE: 0.9 INJECTION, SOLUTION INTRAVENOUS at 12:01

## 2018-01-02 RX ADMIN — ALLOPURINOL 300 MG: 300 TABLET ORAL at 08:01

## 2018-01-02 RX ADMIN — METRONIDAZOLE 500 MG: 500 INJECTION, SOLUTION INTRAVENOUS at 12:01

## 2018-01-02 RX ADMIN — APIXABAN 2.5 MG: 2.5 TABLET, FILM COATED ORAL at 08:01

## 2018-01-02 RX ADMIN — LISINOPRIL 20 MG: 20 TABLET ORAL at 08:01

## 2018-01-02 RX ADMIN — METOPROLOL SUCCINATE 50 MG: 50 TABLET, EXTENDED RELEASE ORAL at 08:01

## 2018-01-02 RX ADMIN — ASPIRIN 81 MG CHEWABLE TABLET 81 MG: 81 TABLET CHEWABLE at 08:01

## 2018-01-02 NOTE — PLAN OF CARE
01/02/18 1354   Discharge Assessment   Assessment Type Discharge Planning Assessment   Confirmed/corrected address and phone number on facesheet? Yes   Assessment information obtained from? Patient   Communicated expected length of stay with patient/caregiver no;yes   Prior to hospitilization cognitive status: Alert/Oriented   Prior to hospitalization functional status: Independent   Current cognitive status: Alert/Oriented   Current Functional Status: Independent   Lives With spouse   Able to Return to Prior Arrangements yes   Is patient able to care for self after discharge? Yes   Readmission Within The Last 30 Days no previous admission in last 30 days   Patient currently being followed by outpatient case management? No   Patient currently receives any other outside agency services? No   Equipment Currently Used at Home cane, straight;grab bar;shower chair   Do you have any problems affording any of your prescribed medications? No   Is the patient taking medications as prescribed? yes   Does the patient have transportation home? Yes   Transportation Available car   Does the patient receive services at the [facility]? No   Discharge Plan A Home   Discharge Plan B Home with family   Patient/Family In Agreement With Plan yes

## 2018-01-02 NOTE — SUBJECTIVE & OBJECTIVE
Past Medical History:   Diagnosis Date    Allergy     Arthritis     Atrial fibrillation 12/4/2015    Basal cell carcinoma     Chronic diastolic heart failure     Colon polyp     Coronary artery disease     Elevated PSA     Hyperlipidemia     Hypertension     Moderate mitral regurgitation     Prostate cancer     PVD (peripheral vascular disease)     Squamous cell carcinoma        Past Surgical History:   Procedure Laterality Date    COLON SURGERY      radiation      prostate cancer    S/P BILATERAL RENAL STENT  04/08/2004    PTCA    TONSILLECTOMY         Review of patient's allergies indicates:   Allergen Reactions    Iodine      Other reaction(s): Unknown       No current facility-administered medications on file prior to encounter.      Current Outpatient Prescriptions on File Prior to Encounter   Medication Sig    allopurinol (ZYLOPRIM) 300 MG tablet TAKE 1 TABLET (300 MG TOTAL) BY MOUTH ONCE DAILY.    amiodarone (PACERONE) 200 MG Tab TAKE 1 TABLET EVERY DAY    aspirin 81 mg Tab Take 81 mg by mouth Daily.    atorvastatin (LIPITOR) 20 MG tablet TAKE 1 TABLET ONE TIME DAILY    ELIQUIS 2.5 mg Tab TAKE 1 TABLET TWICE DAILY    hyoscyamine (LEVSIN/SL) 0.125 mg Subl Take 1 tablet (0.125 mg total) by mouth every 6 (six) hours as needed.    lisinopril (PRINIVIL,ZESTRIL) 20 MG tablet Take 1 tablet (20 mg total) by mouth once daily.    metoprolol succinate (TOPROL-XL) 50 MG 24 hr tablet TAKE 1 TABLET EVERY DAY    multivitamin-minerals-lutein (CENTRUM SILVER) Tab Take 1 tablet by mouth Daily.    tamsulosin (FLOMAX) 0.4 mg Cp24 TAKE 1 CAPSULE EVERY DAY     Family History     Problem Relation (Age of Onset)    Cancer Mother    No Known Problems Father, Sister, Maternal Grandmother, Maternal Grandfather, Paternal Grandmother, Paternal Grandfather, Brother, Maternal Aunt, Maternal Uncle, Paternal Aunt, Paternal Uncle        Social History Main Topics    Smoking status: Former Smoker     Years: 30.00      Types: Cigars     Quit date: 1/1/1998    Smokeless tobacco: Never Used    Alcohol use 3.6 oz/week     1 Shots of liquor, 5 Standard drinks or equivalent per week      Comment: 1 a day    Drug use: No    Sexual activity: No     Review of Systems   Constitutional: Positive for unexpected weight change. Negative for chills and fever.   HENT: Negative for congestion.    Eyes: Negative for visual disturbance.   Respiratory: Negative for cough, chest tightness and shortness of breath.    Cardiovascular: Negative for chest pain, palpitations and leg swelling.   Gastrointestinal: Positive for diarrhea, nausea and vomiting. Negative for abdominal distention and abdominal pain.   Genitourinary: Negative for frequency and hematuria.   Musculoskeletal: Negative for arthralgias and myalgias.   Skin: Negative.    Neurological: Positive for dizziness and weakness. Negative for seizures and syncope.     Objective:     Vital Signs (Most Recent):  Temp: 97.7 °F (36.5 °C) (01/01/18 1733)  Pulse: 72 (01/01/18 1733)  Resp: 18 (01/01/18 1733)  BP: (!) 131/58 (01/01/18 1733)  SpO2: 100 % (01/01/18 1733) Vital Signs (24h Range):  Temp:  [97.7 °F (36.5 °C)] 97.7 °F (36.5 °C)  Pulse:  [72] 72  Resp:  [18] 18  SpO2:  [100 %] 100 %  BP: (131)/(58) 131/58     Weight: 68 kg (150 lb)  Body mass index is 21.52 kg/m².    Physical Exam   Constitutional: He is oriented to person, place, and time. He appears well-developed. No distress.   HENT:   Head: Normocephalic and atraumatic.   Mouth/Throat: Mucous membranes are dry.   Eyes: EOM are normal. Pupils are equal, round, and reactive to light.   Neck: Normal range of motion. Neck supple.   Cardiovascular: Normal rate, regular rhythm, normal heart sounds and intact distal pulses.    Pulmonary/Chest: Effort normal and breath sounds normal. No respiratory distress.   Abdominal: Soft. Bowel sounds are normal. He exhibits no distension. There is no tenderness.   Musculoskeletal: Normal range of  motion. He exhibits no edema.   Neurological: He is alert and oriented to person, place, and time.   Skin: Skin is warm and dry. He is not diaphoretic.   Psychiatric: He has a normal mood and affect. His behavior is normal.   Nursing note and vitals reviewed.        CRANIAL NERVES     CN III, IV, VI   Pupils are equal, round, and reactive to light.  Extraocular motions are normal.        Significant Labs:   CBC:   Recent Labs  Lab 01/01/18  2228   WBC 22.08*   HGB 13.4*   HCT 40.5        CMP:   Recent Labs  Lab 01/01/18  2228      K 5.1      CO2 23   *   BUN 38*   CREATININE 1.9*   CALCIUM 9.2   PROT 7.1   ALBUMIN 3.3*   BILITOT 0.8   ALKPHOS 103   AST 43*   ALT 48*   ANIONGAP 10   EGFRNONAA 30.4*       Significant Imaging: None

## 2018-01-02 NOTE — PLAN OF CARE
Problem: Patient Care Overview  Goal: Plan of Care Review  Plan of care discussed with patient.  Fall precautions in place. Patient has no complaints of pain. Discussed medications and care. IV fluids gtt continued. Patient has no questions at this time.White board updated. Bed locked in lowest position. Side rails up x2. Will continue to monitor.

## 2018-01-02 NOTE — PROGRESS NOTES
[facility] Hospital Medicine  Progress Note    Patient Name: Arie Salinas  MRN: [id number]  Patient Class: OP- Observation   Admission Date: 1/1/2018  Length of Stay: 0 days  Attending Physician: Eliane Allen MD  Primary Care Provider: Collin Mendes Jr, MD    [facility] Medicine Team: [facility]OSP MED Z Eliane Allen MD    Subjective:     Principal Problem:Diarrhea    HPI:  89 y/o gentleman, who was in his usual state of health when he went to his cousin's house to have lunch.  Shortly after having lunch he began to have diarrhea x 2 episodes and then felt weak and other guests said he looked pain.  He then vomited twice and 911 was called to bring him to the ED. He reports associative dizziness, weakness, and diaphoresis with original episode.  He denies abdominal pain, cramping, or nausea at this time. Initial workup reveals leukocytosis (22.08), abdominal exam benign. Due to concern for C diff, he was started on metronidazole IV and given an NS bolus, however, a sample was never sent to the lab.  He denies recent illness / exposure to ill individuals, fever, chills, bloody stool, CP, SOB, or  symptoms.  He has a PMH of arthritis, HTN, HLD, CHF, CAD, A-fib, and prostate cancer s/p radiation.     Hospital Course:  Pt was admitted to [facility] with IV metronidazole and was given IV fluids overnight.  The following morning, the patient was feeling much better. WBC down to 13.  No bowel movements since episode of diarrhea in the ED on presentation. He tolerated a full breakfast this morning and says that he feels like his normal self.     Interval History: All symptoms resolved this AM and pt is feeling well.  Would like to go home.  Seen sitting up in bedside chair.     Review of Systems   Constitutional: Negative for appetite change, chills, fatigue and fever.   Respiratory: Negative for cough, chest tightness and shortness of breath.    Cardiovascular: Negative for chest pain and  palpitations.   Gastrointestinal: Negative for abdominal pain, blood in stool, constipation and diarrhea.   Genitourinary: Negative for dysuria, frequency and urgency.   Neurological: Negative for dizziness, weakness, light-headedness and headaches.     Objective:     Vital Signs (Most Recent):  Temp: 97.6 °F (36.4 °C) (01/02/18 0825)  Pulse: 61 (01/02/18 1130)  Resp: 18 (01/02/18 1130)  BP: (!) 90/52 (01/02/18 1130)  SpO2: 95 % (01/02/18 1130) Vital Signs (24h Range):  Temp:  [97.6 °F (36.4 °C)-98.3 °F (36.8 °C)] 97.6 °F (36.4 °C)  Pulse:  [61-83] 61  Resp:  [18-38] 18  SpO2:  [95 %-100 %] 95 %  BP: ()/(50-64) 90/52     Weight: 71.4 kg (157 lb 6.5 oz)  Body mass index is 22.59 kg/m².    Intake/Output Summary (Last 24 hours) at 01/02/18 1404  Last data filed at 01/02/18 1100   Gross per 24 hour   Intake          2319.25 ml   Output              250 ml   Net          2069.25 ml      Physical Exam   Constitutional: He is oriented to person, place, and time. He appears well-developed and well-nourished. No distress.   Cardiovascular: Normal rate and regular rhythm.    No murmur heard.  Pulmonary/Chest: Effort normal and breath sounds normal. He has no wheezes.   Abdominal: Soft. Bowel sounds are normal. He exhibits no distension. There is no tenderness.   Neurological: He is alert and oriented to person, place, and time.       Assessment/Plan:      * Diarrhea    Resolved.   - due to concern for C dif, pt was started on metronidazole while in ED   - no c dif sample collected and pt no longer having diarrhea  - abdominal exam benign   - Suspect symptoms more likely due to food poisoning, but to be safe, will discharge with flagyl PO x 10 days.         Diastolic dysfunction    - most recent ECHO 03/17/2017 EF estimated at 60% with diastolic dysfunction present        Atrial fibrillation    On amiodarone therapy  - continue home medication regimen        Essential hypertension    - continue home antihypertensive  regimen as directed         Prostate cancer    - s/p radiation         Hyperlipidemia    - continue atorvastatin as directed        Nausea & vomiting    - denies nausea at this time   - continue PRN antiemetics           VTE Risk Mitigation         Ordered     apixaban tablet 2.5 mg  2 times daily     Route:  Oral        01/02/18 0041     Medium Risk of VTE  Once      01/02/18 0041     Place PADMINI hose  Until discontinued      01/02/18 0041     Place sequential compression device  Until discontinued      01/02/18 0041     Reason for No Pharmacological VTE Prophylaxis  Once      01/02/18 0041              Eliane Allen MD  Department of Hospital Medicine   [facility]

## 2018-01-02 NOTE — HOSPITAL COURSE
Pt was admitted to [facility] with IV metronidazole and was given IV fluids overnight.  The following morning, the patient was feeling much better. WBC down to 13.  No bowel movements since episode of diarrhea in the ED on presentation. He tolerated a full breakfast this morning and says that he feels like his normal self.

## 2018-01-02 NOTE — H&P
[facility] Hospital Medicine  History & Physical    Patient Name: Arie Salinas  MRN: [id number]  Admission Date: 1/1/2018  Attending Physician: Eliane Allen MD   Primary Care Provider: Collin Mendes Jr, MD    Hospital Medicine Team: Networked reference to record PCT  Saroj Jack NP     Patient information was obtained from patient and ER records.     Subjective:     Principal Problem:Diarrhea    Chief Complaint:   Chief Complaint   Patient presents with    Nausea     nausea vomiting and diarrhea     Emesis        HPI: 89 y/o gentleman, who presents to the ED with c/o N/V and multiple episodes of diarrhea.  He reports associative dizziness, weakness, and diaphoresis with original episode.  He denies abdominal pain, cramping, or nausea at this time. Initial workup reveals leukocytosis (22.08), abdominal exam benign, Cdiff pending. ED staff concerned for Cdiff.  He was started on metronidazole IV and given an NS bolus.   He denies recent illness / exposure to ill individuals, fever, chills, bloody stool, CP, SOB, or  symptoms.  He has a PMH of arthritis, HTN, HLD, CHF, CAD, A-fib, and prostate cancer s/p radiation.     Past Medical History:   Diagnosis Date    Allergy     Arthritis     Atrial fibrillation 12/4/2015    Basal cell carcinoma     Chronic diastolic heart failure     Colon polyp     Coronary artery disease     Elevated PSA     Hyperlipidemia     Hypertension     Moderate mitral regurgitation     Prostate cancer     PVD (peripheral vascular disease)     Squamous cell carcinoma        Past Surgical History:   Procedure Laterality Date    COLON SURGERY      radiation      prostate cancer    S/P BILATERAL RENAL STENT  04/08/2004    PTCA    TONSILLECTOMY         Review of patient's allergies indicates:   Allergen Reactions    Iodine      Other reaction(s): Unknown       No current facility-administered medications on file prior to encounter.      Current  Outpatient Prescriptions on File Prior to Encounter   Medication Sig    allopurinol (ZYLOPRIM) 300 MG tablet TAKE 1 TABLET (300 MG TOTAL) BY MOUTH ONCE DAILY.    amiodarone (PACERONE) 200 MG Tab TAKE 1 TABLET EVERY DAY    aspirin 81 mg Tab Take 81 mg by mouth Daily.    atorvastatin (LIPITOR) 20 MG tablet TAKE 1 TABLET ONE TIME DAILY    ELIQUIS 2.5 mg Tab TAKE 1 TABLET TWICE DAILY    hyoscyamine (LEVSIN/SL) 0.125 mg Subl Take 1 tablet (0.125 mg total) by mouth every 6 (six) hours as needed.    lisinopril (PRINIVIL,ZESTRIL) 20 MG tablet Take 1 tablet (20 mg total) by mouth once daily.    metoprolol succinate (TOPROL-XL) 50 MG 24 hr tablet TAKE 1 TABLET EVERY DAY    multivitamin-minerals-lutein (CENTRUM SILVER) Tab Take 1 tablet by mouth Daily.    tamsulosin (FLOMAX) 0.4 mg Cp24 TAKE 1 CAPSULE EVERY DAY     Family History     Problem Relation (Age of Onset)    Cancer Mother    No Known Problems Father, Sister, Maternal Grandmother, Maternal Grandfather, Paternal Grandmother, Paternal Grandfather, Brother, Maternal Aunt, Maternal Uncle, Paternal Aunt, Paternal Uncle        Social History Main Topics    Smoking status: Former Smoker     Years: 30.00     Types: Cigars     Quit date: 1/1/1998    Smokeless tobacco: Never Used    Alcohol use 3.6 oz/week     1 Shots of liquor, 5 Standard drinks or equivalent per week      Comment: 1 a day    Drug use: No    Sexual activity: No     Review of Systems   Constitutional: Positive for unexpected weight change. Negative for chills and fever.   HENT: Negative for congestion.    Eyes: Negative for visual disturbance.   Respiratory: Negative for cough, chest tightness and shortness of breath.    Cardiovascular: Negative for chest pain, palpitations and leg swelling.   Gastrointestinal: Positive for diarrhea, nausea and vomiting. Negative for abdominal distention and abdominal pain.   Genitourinary: Negative for frequency and hematuria.   Musculoskeletal: Negative for  arthralgias and myalgias.   Skin: Negative.    Neurological: Positive for dizziness and weakness. Negative for seizures and syncope.     Objective:     Vital Signs (Most Recent):  Temp: 97.7 °F (36.5 °C) (01/01/18 1733)  Pulse: 72 (01/01/18 1733)  Resp: 18 (01/01/18 1733)  BP: (!) 131/58 (01/01/18 1733)  SpO2: 100 % (01/01/18 1733) Vital Signs (24h Range):  Temp:  [97.7 °F (36.5 °C)] 97.7 °F (36.5 °C)  Pulse:  [72] 72  Resp:  [18] 18  SpO2:  [100 %] 100 %  BP: (131)/(58) 131/58     Weight: 68 kg (150 lb)  Body mass index is 21.52 kg/m².    Physical Exam   Constitutional: He is oriented to person, place, and time. He appears well-developed. No distress.   HENT:   Head: Normocephalic and atraumatic.   Mouth/Throat: Mucous membranes are dry.   Eyes: EOM are normal. Pupils are equal, round, and reactive to light.   Neck: Normal range of motion. Neck supple.   Cardiovascular: Normal rate, regular rhythm, normal heart sounds and intact distal pulses.    Pulmonary/Chest: Effort normal and breath sounds normal. No respiratory distress.   Abdominal: Soft. Bowel sounds are normal. He exhibits no distension. There is no tenderness.   Musculoskeletal: Normal range of motion. He exhibits no edema.   Neurological: He is alert and oriented to person, place, and time.   Skin: Skin is warm and dry. He is not diaphoretic.   Psychiatric: He has a normal mood and affect. His behavior is normal.   Nursing note and vitals reviewed.        CRANIAL NERVES     CN III, IV, VI   Pupils are equal, round, and reactive to light.  Extraocular motions are normal.        Significant Labs:   CBC:   Recent Labs  Lab 01/01/18 2228   WBC 22.08*   HGB 13.4*   HCT 40.5        CMP:   Recent Labs  Lab 01/01/18 2228      K 5.1      CO2 23   *   BUN 38*   CREATININE 1.9*   CALCIUM 9.2   PROT 7.1   ALBUMIN 3.3*   BILITOT 0.8   ALKPHOS 103   AST 43*   ALT 48*   ANIONGAP 10   EGFRNONAA 30.4*       Significant Imaging:  None    Assessment/Plan:     * Diarrhea    - presents after multiple episodes of diarrhea   - afebrile, leukocytosis (22.08)  - started on metronidazole while in ED - continue as prescribed for now  - abdominal exam benign   - Cdiff screen pending   - maintain Cdiff isolation for now         Nausea & vomiting    - denies nausea at this time   - continue PRN antiemetics         Diastolic dysfunction    - most recent ECHO 03/17/2017 EF estimated at 60% with diastolic dysfunction present  - monitor fluid status closely while rehydrating         Atrial fibrillation    On amiodarone therapy  - continue home medication regimen  - maintain on telemetry for now        Essential hypertension    - continue home antihypertensive regimen as directed         Prostate cancer    - s/p radiation         Hyperlipidemia    - continue atorvastatin as directed          VTE Risk Mitigation         Ordered     apixaban tablet 2.5 mg  2 times daily     Route:  Oral        01/02/18 0041     Medium Risk of VTE  Once      01/02/18 0041     Place PADMINI hose  Until discontinued      01/02/18 0041     Place sequential compression device  Until discontinued      01/02/18 0041     Reason for No Pharmacological VTE Prophylaxis  Once      01/02/18 0041             Saroj Jack NP  Department of Hospital Medicine   [facility]

## 2018-01-02 NOTE — PLAN OF CARE
[facility]    HOME HEALTH ORDERS  FACE TO FACE ENCOUNTER    Patient Name: Arie Salinas  YOB: 1927    PCP: Collin Mendes Jr, MD   PCP Address[place] [address] [place] [place] [address]  PCP Phone Number: [phone]  PCP Fax: [phone]    Encounter Date: 01/02/2018    Admit to Home Health    Diagnoses:  Active Hospital Problems    Diagnosis  POA    *Diarrhea [R19.7]  Yes    Nausea & vomiting [R11.2]  Yes    Diastolic dysfunction [I51.9]  Yes    Essential hypertension [I10]  Yes    On amiodarone therapy [Z79.899]  Not Applicable    CKD (chronic kidney disease) stage 3, GFR 30-59 ml/min [N18.3]  Yes     Chronic    Prostate cancer [C61]  Yes    Atrial fibrillation [I48.91]  Yes    Hyperlipidemia [E78.5]  Yes     Chronic      Resolved Hospital Problems    Diagnosis Date Resolved POA   No resolved problems to display.       Future Appointments  Date Time Provider Department Center   2/6/2018 1:00 PM EKG, APPT NOM EKG [facility]   2/6/2018 2:00 PM [facility]   2/20/2018 10:30 AM Catherine Ascencio MD [facility] DERM [facility]           I have seen and examined this patient face to face today. My clinical findings that support the need for the home health skilled services and home bound status are the following:  Weakness/numbness causing balance and gait disturbance due to Infection making it taxing to leave home.    Allergies:  Review of patient's allergies indicates:   Allergen Reactions    Iodine      Other reaction(s): Unknown       Diet: cardiac diet    Activities: ambulate in house with assistance    Nursing:   SN to complete comprehensive assessment including routine vital signs. Instruct on disease process and s/s of complications to report to MD. Review/verify medication list sent home with the patient at time of discharge  and instruct patient/caregiver as needed. Frequency may be adjusted depending on start of care  date.    Notify MD if SBP > 160 or < 90; DBP > 90 or < 50; HR > 120 or < 50; Temp > 101    CONSULTS:    Physical Therapy to evaluate and treat. Evaluate for home safety and equipment needs; Establish/upgrade home exercise program. Perform / instruct on therapeutic exercises, gait training, transfer training, and Range of Motion.  Occupational Therapy to evaluate and treat. Evaluate home environment for safety and equipment needs. Perform/Instruct on transfers, ADL training, ROM, and therapeutic exercises.    MISCELLANEOUS CARE:  N/A    WOUND CARE ORDERS  n/a      Medications: Review discharge medications with patient and family and provide education.      Current Discharge Medication List      CONTINUE these medications which have NOT CHANGED    Details   allopurinol (ZYLOPRIM) 300 MG tablet TAKE 1 TABLET (300 MG TOTAL) BY MOUTH ONCE DAILY.  Qty: 90 tablet, Refills: 3      amiodarone (PACERONE) 200 MG Tab TAKE 1 TABLET EVERY DAY  Qty: 90 tablet, Refills: 3      aspirin 81 mg Tab Take 81 mg by mouth Daily.      atorvastatin (LIPITOR) 20 MG tablet TAKE 1 TABLET ONE TIME DAILY  Qty: 90 tablet, Refills: 3    Associated Diagnoses: Hyperlipidemia, unspecified hyperlipidemia type      ELIQUIS 2.5 mg Tab TAKE 1 TABLET TWICE DAILY  Qty: 180 tablet, Refills: 3    Associated Diagnoses: Paroxysmal atrial fibrillation      hyoscyamine (LEVSIN/SL) 0.125 mg Subl Take 1 tablet (0.125 mg total) by mouth every 6 (six) hours as needed.  Qty: 30 tablet, Refills: 6      lisinopril (PRINIVIL,ZESTRIL) 20 MG tablet Take 1 tablet (20 mg total) by mouth once daily.  Qty: 90 tablet, Refills: 3      metoprolol succinate (TOPROL-XL) 50 MG 24 hr tablet TAKE 1 TABLET EVERY DAY  Qty: 90 tablet, Refills: 3    Associated Diagnoses: Hypertension, essential      multivitamin-minerals-lutein (CENTRUM SILVER) Tab Take 1 tablet by mouth Daily.      tamsulosin (FLOMAX) 0.4 mg Cp24 TAKE 1 CAPSULE EVERY DAY  Qty: 90 capsule, Refills: 0    Associated  Diagnoses: Prostatism             I certify that this patient is confined to his home and needs physical therapy and occupational therapy.

## 2018-01-02 NOTE — ED TRIAGE NOTES
Arie aSlinas, a 89 y.o. male presents to the ED c/o of uncontrollable diarrhea that started around 3pm today.      Chief Complaint   Patient presents with    Nausea     nausea vomiting and diarrhea     Emesis     Review of patient's allergies indicates:   Allergen Reactions    Iodine      Other reaction(s): Unknown     Past Medical History:   Diagnosis Date    Allergy     Arthritis     Atrial fibrillation 12/4/2015    Basal cell carcinoma     Chronic diastolic heart failure     Colon polyp     Coronary artery disease     Elevated PSA     Hyperlipidemia     Hypertension     Moderate mitral regurgitation     Prostate cancer     PVD (peripheral vascular disease)     Squamous cell carcinoma

## 2018-01-02 NOTE — HPI
89 y/o gentleman, who was in his usual state of health when he went to his cousin's house to have lunch.  Shortly after having lunch he began to have diarrhea x 2 episodes and then felt weak and other guests said he looked pain.  He then vomited twice and 911 was called to bring him to the ED. He reports associative dizziness, weakness, and diaphoresis with original episode.  He denies abdominal pain, cramping, or nausea at this time. Initial workup reveals leukocytosis (22.08), abdominal exam benign. Due to concern for C diff, he was started on metronidazole IV and given an NS bolus, however, a sample was never sent to the lab.  He denies recent illness / exposure to ill individuals, fever, chills, bloody stool, CP, SOB, or  symptoms.  He has a PMH of arthritis, HTN, HLD, CHF, CAD, A-fib, and prostate cancer s/p radiation.

## 2018-01-02 NOTE — PLAN OF CARE
01/02/18 1356   Final Note   Assessment Type Final Discharge Note   Discharge Disposition Home   What phone number can be called within the next 1-3 days to see how you are doing after discharge? ([phone])   Hospital Follow Up  Appt(s) scheduled? Yes   Discharge plans and expectations educations in teach back method with documentation complete? Yes   Right Care Referral Info   Post Acute Recommendation Home-care

## 2018-01-02 NOTE — PROGRESS NOTES
Pt discharged per MD orders.  Tele discontinued.  Visi discontinued and returned to station; no iPad in room.  IV discontinued; catheter tip intact x1.  Medication list and prescriptions reviewed; prescriptions sent to pt preferred pharmacy.  Pt verbalizes understanding of all written and verbal discharge instructions.  MIS performed and documented in chart. Pt awaiting escort arrival.  Will continue to monitor.

## 2018-01-02 NOTE — SUBJECTIVE & OBJECTIVE
Interval History: All symptoms resolved this AM and pt is feeling well.  Would like to go home.  Seen sitting up in bedside chair.     Review of Systems   Constitutional: Negative for appetite change, chills, fatigue and fever.   Respiratory: Negative for cough, chest tightness and shortness of breath.    Cardiovascular: Negative for chest pain and palpitations.   Gastrointestinal: Negative for abdominal pain, blood in stool, constipation and diarrhea.   Genitourinary: Negative for dysuria, frequency and urgency.   Neurological: Negative for dizziness, weakness, light-headedness and headaches.     Objective:     Vital Signs (Most Recent):  Temp: 97.6 °F (36.4 °C) (01/02/18 0825)  Pulse: 61 (01/02/18 1130)  Resp: 18 (01/02/18 1130)  BP: (!) 90/52 (01/02/18 1130)  SpO2: 95 % (01/02/18 1130) Vital Signs (24h Range):  Temp:  [97.6 °F (36.4 °C)-98.3 °F (36.8 °C)] 97.6 °F (36.4 °C)  Pulse:  [61-83] 61  Resp:  [18-38] 18  SpO2:  [95 %-100 %] 95 %  BP: ()/(50-64) 90/52     Weight: 71.4 kg (157 lb 6.5 oz)  Body mass index is 22.59 kg/m².    Intake/Output Summary (Last 24 hours) at 01/02/18 1404  Last data filed at 01/02/18 1100   Gross per 24 hour   Intake          2319.25 ml   Output              250 ml   Net          2069.25 ml      Physical Exam   Constitutional: He is oriented to person, place, and time. He appears well-developed and well-nourished. No distress.   Cardiovascular: Normal rate and regular rhythm.    No murmur heard.  Pulmonary/Chest: Effort normal and breath sounds normal. He has no wheezes.   Abdominal: Soft. Bowel sounds are normal. He exhibits no distension. There is no tenderness.   Neurological: He is alert and oriented to person, place, and time.

## 2018-01-02 NOTE — ASSESSMENT & PLAN NOTE
Resolved.   - due to concern for C dif, pt was started on metronidazole while in ED   - no c dif sample collected and pt no longer having diarrhea  - abdominal exam benign   - Suspect symptoms more likely due to food poisoning, but to be safe, will discharge with flagyl PO x 10 days.

## 2018-01-02 NOTE — ASSESSMENT & PLAN NOTE
- most recent ECHO 03/17/2017 EF estimated at 60% with diastolic dysfunction present  - monitor fluid status closely while rehydrating

## 2018-01-02 NOTE — PLAN OF CARE
Problem: Patient Care Overview  Goal: Plan of Care Review  Outcome: Outcome(s) achieved Date Met: 01/02/18  Pt free of falls/traumas/injuries. Denies c/o SOB, CP, or discomfort. Generalized skin remains CDI; no edema noted.  Pt treated with Flagyl IVPB and to be discharged home with PO Flagyl TID.  No c/o further diarrhea this shift.  Family at the bedside.  Pt and family tolerating plan of care.

## 2018-01-02 NOTE — ASSESSMENT & PLAN NOTE
- presents after multiple episodes of diarrhea   - afebrile, leukocytosis (22.08)  - started on metronidazole while in ED - continue as prescribed for now  - abdominal exam benign   - Cdiff screen pending   - maintain Cdiff isolation for now

## 2018-01-02 NOTE — DISCHARGE SUMMARY
[facility] Hospital Medicine  Discharge Summary      Patient Name: Arie Salinas  MRN: [id number]  Admission Date: 1/1/2018  Hospital Length of Stay: 0 days  Discharge Date and Time:  01/02/2018 2:09 PM  Attending Physician: Eliane Allen MD   Discharging Provider: Eliane Allen MD  Primary Care Provider: Collin Mendes Jr, MD  Hospital Medicine Team: [facility] Eliane Allen MD    HPI:   89 y/o gentleman, who was in his usual state of health when he went to his cousin's house to have lunch.  Shortly after having lunch he began to have diarrhea x 2 episodes and then felt weak and other guests said he looked pain.  He then vomited twice and 911 was called to bring him to the ED. He reports associative dizziness, weakness, and diaphoresis with original episode.  He denies abdominal pain, cramping, or nausea at this time. Initial workup reveals leukocytosis (22.08), abdominal exam benign. Due to concern for C diff, he was started on metronidazole IV and given an NS bolus, however, a sample was never sent to the lab.  He denies recent illness / exposure to ill individuals, fever, chills, bloody stool, CP, SOB, or  symptoms.  He has a PMH of arthritis, HTN, HLD, CHF, CAD, A-fib, and prostate cancer s/p radiation.       Hospital Course:   Pt was admitted to [facility] with IV metronidazole and was given IV fluids overnight.  The following morning, the patient was feeling much better. WBC down to 13.  No bowel movements since episode of diarrhea in the ED on presentation. He tolerated a full breakfast this morning and says that he feels like his normal self.      Consults:   Consults         Status Ordering Provider     Case Management/  Once     Provider:  (Not yet assigned)    Acknowledged WAY, KAILA WHEELER          * Diarrhea    Resolved.   - due to concern for C dif, pt was started on metronidazole while in ED   - no c dif sample collected and pt no longer having  diarrhea  - abdominal exam benign   - Suspect symptoms more likely due to food poisoning, but to be safe, will discharge with flagyl PO x 10 days.         Diastolic dysfunction    - most recent ECHO 03/17/2017 EF estimated at 60% with diastolic dysfunction present        Atrial fibrillation    On amiodarone therapy  - continue home medication regimen        Essential hypertension    - continue home antihypertensive regimen as directed         Prostate cancer    - s/p radiation         Hyperlipidemia    - continue atorvastatin as directed          Final Active Diagnoses:    Diagnosis Date Noted POA    PRINCIPAL PROBLEM:  Diarrhea [R19.7] 01/02/2018 Yes    Diastolic dysfunction [I51.9] 01/02/2018 Yes    Atrial fibrillation [I48.91] 12/04/2015 Yes    Essential hypertension [I10] 04/10/2017 Yes    Prostate cancer [C61] 07/27/2016 Yes    Hyperlipidemia [E78.5] 08/06/2012 Yes     Chronic    Nausea & vomiting [R11.2] 01/02/2018 Yes    On amiodarone therapy [Z79.899] 01/31/2017 Not Applicable    CKD (chronic kidney disease) stage 3, GFR 30-59 ml/min [N18.3] 08/17/2016 Yes     Chronic      Problems Resolved During this Admission:    Diagnosis Date Noted Date Resolved POA       Discharged Condition: good    [place] [facility]    Follow Up:  Follow-up Information     Collin Mendes Jr, MD On 1/22/2018.    Specialty:  Internal Medicine  Why:  11:00 am , For Hospital Follow-up  Contact information:  [address]  [place] [address]  [phone]                 Patient Instructions:     Diet Cardiac (2gm sodium and 60gm fat)     Activity as tolerated         Pending Diagnostic Studies:     None         Medications:  Reconciled Home Medications:   Current Discharge Medication List      START taking these medications    Details   metroNIDAZOLE (FLAGYL) 500 MG tablet Take 1 tablet (500 mg total) by mouth 3 (three) times daily.  Qty: 30 tablet, Refills: 0         CONTINUE these medications which  have NOT CHANGED    Details   allopurinol (ZYLOPRIM) 300 MG tablet TAKE 1 TABLET (300 MG TOTAL) BY MOUTH ONCE DAILY.  Qty: 90 tablet, Refills: 3      amiodarone (PACERONE) 200 MG Tab TAKE 1 TABLET EVERY DAY  Qty: 90 tablet, Refills: 3      aspirin 81 mg Tab Take 81 mg by mouth Daily.      atorvastatin (LIPITOR) 20 MG tablet TAKE 1 TABLET ONE TIME DAILY  Qty: 90 tablet, Refills: 3    Associated Diagnoses: Hyperlipidemia, unspecified hyperlipidemia type      ELIQUIS 2.5 mg Tab TAKE 1 TABLET TWICE DAILY  Qty: 180 tablet, Refills: 3    Associated Diagnoses: Paroxysmal atrial fibrillation      hyoscyamine (LEVSIN/SL) 0.125 mg Subl Take 1 tablet (0.125 mg total) by mouth every 6 (six) hours as needed.  Qty: 30 tablet, Refills: 6      lisinopril (PRINIVIL,ZESTRIL) 20 MG tablet Take 1 tablet (20 mg total) by mouth once daily.  Qty: 90 tablet, Refills: 3      metoprolol succinate (TOPROL-XL) 50 MG 24 hr tablet TAKE 1 TABLET EVERY DAY  Qty: 90 tablet, Refills: 3    Associated Diagnoses: Hypertension, essential      multivitamin-minerals-lutein (CENTRUM SILVER) Tab Take 1 tablet by mouth Daily.      tamsulosin (FLOMAX) 0.4 mg Cp24 TAKE 1 CAPSULE EVERY DAY  Qty: 90 capsule, Refills: 0    Associated Diagnoses: Prostatism             Indwelling Lines/Drains at time of discharge:   Lines/Drains/Airways          No matching active lines, drains, or airways          Time spent on the discharge of patient: 40 minutes  Patient was seen and examined on the date of discharge and determined to be suitable for discharge.         Eliane Allen MD  Department of Hospital Medicine  [facility]

## 2018-01-02 NOTE — ED PROVIDER NOTES
Encounter Date: 1/1/2018    SCRIBE #1 NOTE: I, Deja Flores, am scribing for, and in the presence of, Dr. Gomez.       History     Chief Complaint   Patient presents with    Nausea     nausea vomiting and diarrhea     Emesis       01/01/2018  10:10 PM    Chief Complaint: Vomiting      The patient is a 89 y.o. male who presents with 1 episode of vomiting starting this evening. Associated sx include nausea, diarrhea, and diaphoresis. Pt reports he had one episode of vomiting at home. He states he felt weak and dizzy immediately after the incident. Pt had an episode of diarrhea just prior to exam. He has not been on abx recently. Denies fever. PMHx includes arthritis, HTN, HLD, CHF, CAD, A-fib, and prostate cancer. PSHx includes colon surgery, renal stent, and radiation for prostate cancer.       The history is provided by the patient and a relative.     Review of patient's allergies indicates:   Allergen Reactions    Iodine      Other reaction(s): Unknown     Past Medical History:   Diagnosis Date    Allergy     Arthritis     Atrial fibrillation 12/4/2015    Basal cell carcinoma     Chronic diastolic heart failure     Colon polyp     Coronary artery disease     Elevated PSA     Hyperlipidemia     Hypertension     Moderate mitral regurgitation     Prostate cancer     PVD (peripheral vascular disease)     Squamous cell carcinoma      Past Surgical History:   Procedure Laterality Date    COLON SURGERY      radiation      prostate cancer    S/P BILATERAL RENAL STENT  04/08/2004    PTCA    TONSILLECTOMY       Family History   Problem Relation Age of Onset    Cancer Mother     No Known Problems Father     No Known Problems Sister     No Known Problems Maternal Grandmother     No Known Problems Maternal Grandfather     No Known Problems Paternal Grandmother     No Known Problems Paternal Grandfather     No Known Problems Brother     No Known Problems Maternal Aunt     No Known Problems  Maternal Uncle     No Known Problems Paternal Aunt     No Known Problems Paternal Uncle     Heart disease Neg Hx     Diabetes Neg Hx     Colon polyps Neg Hx     Melanoma Neg Hx     Eczema Neg Hx     Psoriasis Neg Hx     Anemia Neg Hx     Arrhythmia Neg Hx     Asthma Neg Hx     Clotting disorder Neg Hx     Fainting Neg Hx     Heart attack Neg Hx     Heart failure Neg Hx     Hyperlipidemia Neg Hx     Hypertension Neg Hx     Stroke Neg Hx     Atrial Septal Defect Neg Hx      Social History   Substance Use Topics    Smoking status: Former Smoker     Years: 30.00     Types: Cigars     Quit date: 1/1/1998    Smokeless tobacco: Never Used    Alcohol use 3.6 oz/week     1 Shots of liquor, 5 Standard drinks or equivalent per week      Comment: 1 a day     Review of Systems   Constitutional: Positive for diaphoresis. Negative for fever.   HENT: Negative for sore throat.    Respiratory: Negative for shortness of breath.    Cardiovascular: Negative for chest pain.   Gastrointestinal: Positive for diarrhea, nausea and vomiting.   Genitourinary: Negative for dysuria.   Musculoskeletal: Negative for back pain.   Skin: Negative for rash.   Neurological: Positive for dizziness and weakness (Generalized).   Hematological: Does not bruise/bleed easily.       Physical Exam     Initial Vitals [01/01/18 1733]   BP Pulse Resp Temp SpO2   (!) 131/58 72 18 97.7 °F (36.5 °C) 100 %      MAP       82.33         Physical Exam    Nursing note and vitals reviewed.  Constitutional: He appears well-developed and well-nourished. He is not diaphoretic. No distress.   HENT:   Head: Normocephalic and atraumatic.   Mouth/Throat: Oropharynx is clear and moist. Mucous membranes are dry.   Eyes: Conjunctivae are normal.   Neck: Neck supple.   Cardiovascular: Normal rate, regular rhythm, normal heart sounds and intact distal pulses. Exam reveals no gallop and no friction rub.    No murmur heard.  Pulmonary/Chest: Breath sounds normal.  He has no wheezes. He has no rhonchi. He has no rales.   Abdominal: Soft. He exhibits no distension and no mass. There is no tenderness. There is no rebound and no guarding.   Musculoskeletal: Normal range of motion.   Neurological: He is alert and oriented to person, place, and time. No cranial nerve deficit or sensory deficit.   Skin: No rash noted. No erythema.   Decreased turgor.   Psychiatric: He has a normal mood and affect. His behavior is normal. Judgment and thought content normal.         ED Course   Procedures  Labs Reviewed   CBC W/ AUTO DIFFERENTIAL - Abnormal; Notable for the following:        Result Value    WBC 22.08 (*)     RBC 4.43 (*)     Hemoglobin 13.4 (*)     RDW 14.7 (*)     Immature Granulocytes 0.7 (*)     Gran # 20.4 (*)     Immature Grans (Abs) 0.16 (*)     Lymph # 0.2 (*)     Mono # 1.2 (*)     Gran% 92.6 (*)     Lymph% 0.9 (*)     All other components within normal limits   COMPREHENSIVE METABOLIC PANEL - Abnormal; Notable for the following:     Glucose 174 (*)     BUN, Bld 38 (*)     Creatinine 1.9 (*)     Albumin 3.3 (*)     AST 43 (*)     ALT 48 (*)     eGFR if  35.1 (*)     eGFR if non  30.4 (*)     All other components within normal limits   CLOSTRIDIUM DIFFICILE   LIPASE     EKG Readings: (Independently Interpreted)   Initial Reading: No STEMI. Rhythm: Normal Sinus Rhythm. Heart Rate: 78 bpm. Conduction: 1st Degree AV Block.          Medical Decision Making:   History:   Old Medical Records: I decided to obtain old medical records.  Initial Assessment:   Pt presents with c/o diarrhea that started this evening. The smell is reminiscent of C. Diff. He is having trouble controlling it. He states he felt weak and dizzy after 1st episode. Will order labs, fluids, and observation. No risk factors for C. Diff noted.  Clinical Tests:   Lab Tests: Ordered and Reviewed  Medical Tests: Ordered and Reviewed    Discussed with HM for obs given WBC and odor.           Scribe Attestation:   Scribe #1: I performed the above scribed service and the documentation accurately describes the services I performed. I attest to the accuracy of the note.            ED Course      Clinical Impression:     1. Diarrhea, infectious, adult          Disposition:   Disposition: Placed in Observation                        Duane Gomez MD  01/06/18 0950

## 2018-01-03 ENCOUNTER — TELEPHONE (OUTPATIENT)
Dept: INTERNAL MEDICINE | Facility: CLINIC | Age: 83
End: 2018-01-03

## 2018-01-03 NOTE — TELEPHONE ENCOUNTER
----- Message from Eva Rushing sent at 1/3/2018 11:11 AM CST -----  Contact: Duyen/Daughter [phone] home or [phone] cell  Daughter lives out of town but will be in town on 01/23-01/26 and would like to reschedule the appt on 01/22/2018 to one of those days if possible.    Please call and advise.    Thank You

## 2018-01-03 NOTE — TELEPHONE ENCOUNTER
Spoke with pt;s daughter Duyen, dr Mendes is not in the office on the days she requested. Pt will keep the appt on 1/22/2018.

## 2018-01-07 ENCOUNTER — NURSE TRIAGE (OUTPATIENT)
Dept: ADMINISTRATIVE | Facility: CLINIC | Age: 83
End: 2018-01-07

## 2018-01-07 NOTE — TELEPHONE ENCOUNTER
"Admit 1/1  Pt called re flagyl for ? c.diff. Got tablets Monday 1/1.  Urine looking real yellowish. last night loose bowels -2-3x's. Dizzy, lightheaded. Has not eaten today. Urine more concentrated. glass of water during night. Denies SOB or CP. No blood in BMs afeb, no ill. Using cane to get around. Pt is with wife and grandson. hasnt taken flagyl today.     Reason for Disposition   [1] Dizziness caused by heat exposure, sudden standing, or poor fluid intake AND [2] no improvement after 2 hours of rest and fluids    Answer Assessment - Initial Assessment Questions  1. DESCRIPTION: "Describe your dizziness."     Feeling like he might fall when walking on. /63 HR=50   2. LIGHTHEADED: "Do you feel lightheaded?" (e.g., somewhat faint, woozy, weak upon standing)     Weakness   3. VERTIGO: "Do you feel like either you or the room is spinning or tilting?" (i.e. vertigo)    No   4. SEVERITY: "How bad is it?"  "Do you feel like you are going to faint?" "Can you stand and walk?"    - MILD - walking normally    - MODERATE - interferes with normal activities (e.g., work, school)     - SEVERE - unable to stand, requires support to walk, feels like passing out now.     More support to get around   5. ONSET:  "When did the dizziness begin?"     1/6 today   6. AGGRAVATING FACTORS: "Does anything make it worse?" (e.g., standing, change in head position)    Hx afib   7. HEART RATE: "Can you tell me your heart rate?" "How many beats in 15 seconds?"  (Note: not all patients can do this)       50   8. CAUSE: "What do you think is causing the dizziness?"     meds   9. RECURRENT SYMPTOM: "Have you had dizziness before?" If so, ask: "When was the last time?" "What happened that time?"    No   10. OTHER SYMPTOMS: "Do you have any other symptoms?" (e.g., fever, chest pain, vomiting, diarrhea, bleeding)      No    Protocols used: ST DIZZINESS - [id number]  offered to call MD on call. pt states that he will continue med and " follow up with MD in office in am. Pt states that he will eat and drink something now and take med about 20 mins after. EMS warning given. Call back with questions.

## 2018-01-08 NOTE — TELEPHONE ENCOUNTER
Spoke with pt, he is feeling better, states that he's coming with his wife to an appt she has with dr Mendes today.

## 2018-01-10 DIAGNOSIS — I10 HYPERTENSION, ESSENTIAL: ICD-10-CM

## 2018-01-10 DIAGNOSIS — E78.5 HYPERLIPIDEMIA, UNSPECIFIED HYPERLIPIDEMIA TYPE: ICD-10-CM

## 2018-01-10 RX ORDER — METOPROLOL SUCCINATE 50 MG/1
50 TABLET, EXTENDED RELEASE ORAL DAILY
Qty: 90 TABLET | Refills: 3 | Status: SHIPPED | OUTPATIENT
Start: 2018-01-10

## 2018-01-10 RX ORDER — ATORVASTATIN CALCIUM 20 MG/1
TABLET, FILM COATED ORAL
Qty: 90 TABLET | Refills: 3 | Status: SHIPPED | OUTPATIENT
Start: 2018-01-10 | End: 2018-04-18

## 2018-01-22 ENCOUNTER — OFFICE VISIT (OUTPATIENT)
Dept: INTERNAL MEDICINE | Facility: CLINIC | Age: 83
End: 2018-01-22
Payer: MEDICARE

## 2018-01-22 VITALS
OXYGEN SATURATION: 97 % | BODY MASS INDEX: 24.61 KG/M2 | WEIGHT: 171.5 LBS | HEART RATE: 56 BPM | SYSTOLIC BLOOD PRESSURE: 114 MMHG | DIASTOLIC BLOOD PRESSURE: 50 MMHG

## 2018-01-22 DIAGNOSIS — I10 ESSENTIAL HYPERTENSION: Primary | ICD-10-CM

## 2018-01-22 DIAGNOSIS — I34.0 NON-RHEUMATIC MITRAL REGURGITATION: Chronic | ICD-10-CM

## 2018-01-22 PROCEDURE — 99213 OFFICE O/P EST LOW 20 MIN: CPT | Mod: S$PBB,,, | Performed by: INTERNAL MEDICINE

## 2018-01-22 PROCEDURE — 99999 PR PBB SHADOW E&M-EST. PATIENT-LVL III: CPT | Mod: PBBFAC,,, | Performed by: INTERNAL MEDICINE

## 2018-01-22 PROCEDURE — 99213 OFFICE O/P EST LOW 20 MIN: CPT | Mod: PBBFAC | Performed by: INTERNAL MEDICINE

## 2018-01-22 RX ORDER — LISINOPRIL 10 MG/1
10 TABLET ORAL DAILY
Qty: 90 TABLET | Refills: 3 | Status: SHIPPED | OUTPATIENT
Start: 2018-01-22 | End: 2018-05-15 | Stop reason: ALTCHOICE

## 2018-01-22 NOTE — PROGRESS NOTES
Subjective:       Patient ID: Arie Salinas is a 89 y.o. male.    Chief Complaint: Follow-up and Hospital Follow Up    HPI the patient is a 89-year-old male who comes in for follow-up of a recent hospital stay.  He is admitted after an episode of diarrhea.  His symptoms improved after antibiotics and IV fluids.  He is now feeling back to normal.  He will be moving to [facility] shortly.  The patient does report occasional episodes of lightheadedness and dizziness.  He notes blood pressures at home in the range of  systolic.  Review of Systems   Constitutional: Negative for activity change and unexpected weight change.   Respiratory: Negative for chest tightness and shortness of breath.    Cardiovascular: Negative for chest pain and palpitations.   Neurological: Positive for weakness and light-headedness.       Objective:      Physical Exam   Constitutional: He appears well-developed and well-nourished. No distress.   Cardiovascular: Normal rate and regular rhythm.  Exam reveals no gallop and no friction rub.    Murmur heard.  1 to 2/6 systolic murmur heard throughout the precordium   Pulmonary/Chest: Effort normal and breath sounds normal. No respiratory distress. He has no wheezes. He has no rales.   Skin: He is not diaphoretic.   Vitals reviewed.      Assessment:       1. Essential hypertension    2. Non-rheumatic mitral regurgitation      given the patient's relatively low blood pressure, I will decrease his lisinopril to 10 mg by mouth daily  Plan:       1.  Decrease lisinopril to 10 mg by mouth daily   2.  The patient will follow-up with his new physician in [facility].

## 2018-01-26 NOTE — TELEPHONE ENCOUNTER
"----- Message from Susana Hood sent at 1/26/2018 12:30 PM CST -----  Contact: self/[phone]    RX request - refill or new RX.  Is this a refill or new RX:  Refill   RX name and strength: allopurinol (ZYLOPRIM) 300 MG tablet  Directions: TAKE 1 TABLET (300 MG TOTAL) BY MOUTH ONCE DAILY. - Oral  Is this a 30 day or 90 day RX:    Pharmacy name and phone # (DON'T enter "on file" or "in chart"): [facility] - [address] [phone] (Phone)  [phone] (Fax)  Comments:  Please call and advise.    Thank you!!!        "

## 2018-01-29 RX ORDER — ALLOPURINOL 300 MG/1
300 TABLET ORAL DAILY
Qty: 90 TABLET | Refills: 3 | Status: SHIPPED | OUTPATIENT
Start: 2018-01-29 | End: 2018-05-15

## 2018-02-05 DIAGNOSIS — I48.91 ATRIAL FIBRILLATION, UNSPECIFIED TYPE: Primary | ICD-10-CM

## 2018-02-06 ENCOUNTER — OFFICE VISIT (OUTPATIENT)
Dept: ELECTROPHYSIOLOGY | Facility: CLINIC | Age: 83
End: 2018-02-06
Payer: MEDICARE

## 2018-02-06 ENCOUNTER — HOSPITAL ENCOUNTER (OUTPATIENT)
Dept: CARDIOLOGY | Facility: CLINIC | Age: 83
Discharge: HOME | End: 2018-02-06
Payer: MEDICARE

## 2018-02-06 VITALS
BODY MASS INDEX: 24.3 KG/M2 | WEIGHT: 169.75 LBS | HEIGHT: 70 IN | DIASTOLIC BLOOD PRESSURE: 55 MMHG | HEART RATE: 56 BPM | SYSTOLIC BLOOD PRESSURE: 124 MMHG

## 2018-02-06 DIAGNOSIS — I10 ESSENTIAL HYPERTENSION: ICD-10-CM

## 2018-02-06 DIAGNOSIS — I73.9 PAD (PERIPHERAL ARTERY DISEASE): ICD-10-CM

## 2018-02-06 DIAGNOSIS — I48.0 PAROXYSMAL ATRIAL FIBRILLATION: Primary | ICD-10-CM

## 2018-02-06 DIAGNOSIS — I48.91 ATRIAL FIBRILLATION, UNSPECIFIED TYPE: ICD-10-CM

## 2018-02-06 PROCEDURE — 99213 OFFICE O/P EST LOW 20 MIN: CPT | Mod: PBBFAC,25 | Performed by: INTERNAL MEDICINE

## 2018-02-06 PROCEDURE — 1126F AMNT PAIN NOTED NONE PRSNT: CPT | Mod: ,,, | Performed by: INTERNAL MEDICINE

## 2018-02-06 PROCEDURE — 99999 PR PBB SHADOW E&M-EST. PATIENT-LVL III: CPT | Mod: PBBFAC,,, | Performed by: INTERNAL MEDICINE

## 2018-02-06 PROCEDURE — 1159F MED LIST DOCD IN RCRD: CPT | Mod: ,,, | Performed by: INTERNAL MEDICINE

## 2018-02-06 PROCEDURE — 93005 ELECTROCARDIOGRAM TRACING: CPT | Mod: PBBFAC | Performed by: INTERNAL MEDICINE

## 2018-02-06 PROCEDURE — 93010 ELECTROCARDIOGRAM REPORT: CPT | Mod: S$PBB,,, | Performed by: INTERNAL MEDICINE

## 2018-02-06 PROCEDURE — 99214 OFFICE O/P EST MOD 30 MIN: CPT | Mod: S$PBB,,, | Performed by: INTERNAL MEDICINE

## 2018-02-06 NOTE — PROGRESS NOTES
Subjective:    Patient ID:  Arie Salinas is a 89 y.o. male who presents for follow-up of Atrial Fibrillation      Arie Salinas is a 89 y.o. with a history of diastolic HF, CAD, MARTIN, s/p renal stent, and paroxsymal atrial fibrillation.     Prior visits: His history of AF dates back to December when he was admitted with acute chest pain and AF with RVR. He converted to sinus on his own prior to CARLY/eCV. He was then started on an amiodarone oral bolus. Mr. Salinas's CVA risk is elevated, specifically CHADSVASc- 4 ( Age >75, CAD/PVD/MARTIN, HTN) and he is on Eliquis for this, and for the time being he appears to be tolerating this. He feels well today in follow up, denies CP, WAHL, lightheadedness, dizziness, syncope. Currently he is on 400 mg of amiodarone daily ( 200 BID )     8/16: The patient denies interval chest pain, sob, palpitations, syncope, near syncope. He remains on amiodarone 200 mg qd as well as apixaban 5 mg bid. He has had recent increase in Cr leading to nephrology assessment. His renal imaging was without significant findings. He remains asymptomatic with regard to his AF. He is able to exert himself on a routine basis without limitation.     1/17: No symptomatic recurrence on amiodarone 200 mg qd. The patient denies interval chest pain, sob, palpitations, syncope, near syncope.     Interval history: Recent hospitalization for GI illness. No symptomatic recurrence. Saw ophthalmology a few months ago. Normal PFTs 2/17. No respiratory problems.     ECHO 2016  CONCLUSIONS     1 - Normal left ventricular systolic function (EF 60-65%).     2 - Left ventricular diastolic dysfunction.     3 - Biatrial enlargement.     4 - Normal right ventricular systolic function .     5 - The estimated PA systolic pressure is 27 mmHg.     6 - Trivial aortic stenosis, ALEKSANDER = 1.63 cm2, peak velocity = 1.8 m/s, mean gradient = 8.0 mmHg.     7 - Mild to moderate mitral regurgitation.     8 - Mild tricuspid  regurgitation.     9 - Mitral valve prolapse.     SPECT 2012  Impression: NORMAL MYOCARDIAL PERFUSION  1. The perfusion scan is free of evidence for myocardial ischemia or injury.   2. There is a mild intensity fixed defect in the inferior wall of the left ventricle, secondary to diaphragm attenuation.   3. Resting wall motion is physiologic.   4. Resting LV function is normal. (normal is >= 51%)  5. The ventricular volumes are normal at rest and stress.   6. The extracardiac distribution of radioactivity is normal.   7. When compared to the previous study from 06/09/2010, there are no significant interval changes in the perfusion pattern..     Past Medical History:  No date: Allergy  No date: Arthritis  12/4/2015: Atrial fibrillation  No date: Basal cell carcinoma  No date: Chronic diastolic heart failure  No date: Colon polyp  No date: Coronary artery disease  No date: Elevated PSA  No date: Hyperlipidemia  No date: Hypertension  No date: Moderate mitral regurgitation  No date: Prostate cancer  No date: PVD (peripheral vascular disease)  No date: Squamous cell carcinoma    Past Surgical History:  No date: COLON SURGERY  No date: radiation      Comment: prostate cancer  04/08/2004: S/P BILATERAL RENAL STENT      Comment: PTCA  No date: TONSILLECTOMY    Social History    Marital status:              Spouse name: Shelia               Years of education:                 Number of children:               Occupational History  Occupation          Employer            Comment               retired                                     Social History Main Topics    Smoking status: Former Smoker                                                                Packs/day: 0.00      Years: 30.00          Types: Cigars       Quit date: 1/1/1998    Smokeless tobacco: Never Used                        Alcohol use: Yes           3.6 oz/week       Shots of liquor: 1, Standard drinks or equivalent: 5 per week       Comment: 1 a  day    Drug use: No              Sexual activity: No                   Other Topics            Concern    None on file    Social History Narrative    None on file    Review of patient's family history indicates:    Cancer                         Mother                    No Known Problems              Father                    No Known Problems              Sister                    No Known Problems              Maternal Grandmother      No Known Problems              Maternal Grandfather      No Known Problems              Paternal Grandmother      No Known Problems              Paternal Grandfather      No Known Problems              Brother                   No Known Problems              Maternal Aunt             No Known Problems              Maternal Uncle            No Known Problems              Paternal Aunt             No Known Problems              Paternal Uncle            Heart disease                  Neg Hx                    Diabetes                       Neg Hx                    Colon polyps                   Neg Hx                    Melanoma                       Neg Hx                    Eczema                         Neg Hx                    Psoriasis                      Neg Hx                    Anemia                         Neg Hx                    Arrhythmia                     Neg Hx                    Asthma                         Neg Hx                    Clotting disorder              Neg Hx                    Fainting                       Neg Hx                    Heart attack                   Neg Hx                    Heart failure                  Neg Hx                    Hyperlipidemia                 Neg Hx                    Hypertension                   Neg Hx                    Stroke                         Neg Hx                    Atrial Septal Defect           Neg Hx                          Review of Systems   Constitution: Positive for weakness and  malaise/fatigue. Negative for diaphoresis, weight gain and weight loss.   HENT: Negative.    Eyes: Negative.    Cardiovascular: Negative for chest pain, claudication, cyanosis, dyspnea on exertion, irregular heartbeat, near-syncope, orthopnea, palpitations, paroxysmal nocturnal dyspnea and syncope.   Respiratory: Negative for cough, shortness of breath, sleep disturbances due to breathing, sputum production and wheezing.    Endocrine: Negative for cold intolerance and heat intolerance.   Skin: Negative.    Musculoskeletal: Negative.    Gastrointestinal: Negative.  Negative for jaundice, nausea and vomiting.   Neurological: Negative for dizziness, focal weakness, light-headedness and numbness.   Psychiatric/Behavioral: Negative for altered mental status.        Objective:    Physical Exam   Constitutional: He is oriented to person, place, and time. He appears well-developed and well-nourished.   HENT:   Head: Normocephalic and atraumatic.   Mouth/Throat: No oropharyngeal exudate.   Eyes: Right eye exhibits no discharge. No scleral icterus.   Neck: No JVD present. No thyromegaly present.   Cardiovascular: Regular rhythm, normal heart sounds and intact distal pulses.  Bradycardia present.  Exam reveals no gallop and no friction rub.    No murmur heard.  Pulmonary/Chest: Effort normal and breath sounds normal. No stridor. No respiratory distress. He has no wheezes. He has no rales. He exhibits no tenderness.   Abdominal: Bowel sounds are normal. He exhibits no distension. There is no tenderness.   Musculoskeletal: He exhibits no edema.   Neurological: He is alert and oriented to person, place, and time.   Skin: Skin is warm. No rash noted. No erythema. No pallor.   Psychiatric: He has a normal mood and affect. His behavior is normal. Judgment and thought content normal.   Vitals reviewed.    ECG: SBr,  ms nl QRS        Assessment:       1. Paroxysmal atrial fibrillation    2. Essential hypertension    3. PAD  (peripheral artery disease)         Plan:       89 yoM stable pAF on amiodarone. No arrhythmic or bradycardic symptoms. He will be moving to [place], [place] in April. I would recommend no changes to his medical therapy at this time. Would advise establishing care in [place] on his arrival. RTC as needed

## 2018-02-20 ENCOUNTER — OFFICE VISIT (OUTPATIENT)
Dept: DERMATOLOGY | Facility: CLINIC | Age: 83
End: 2018-02-20
Payer: MEDICARE

## 2018-02-20 DIAGNOSIS — D48.5 NEOPLASM OF UNCERTAIN BEHAVIOR OF SKIN: Primary | ICD-10-CM

## 2018-02-20 DIAGNOSIS — L57.0 ACTINIC KERATOSIS: ICD-10-CM

## 2018-02-20 PROCEDURE — 88342 IMHCHEM/IMCYTCHM 1ST ANTB: CPT | Mod: 26,,, | Performed by: PATHOLOGY

## 2018-02-20 PROCEDURE — 99999 PR PBB SHADOW E&M-EST. PATIENT-LVL III: CPT | Mod: PBBFAC,,, | Performed by: DERMATOLOGY

## 2018-02-20 PROCEDURE — 11100 PR BIOPSY OF SKIN LESION: CPT | Mod: 59,PBBFAC | Performed by: DERMATOLOGY

## 2018-02-20 PROCEDURE — 17000 DESTRUCT PREMALG LESION: CPT | Mod: PBBFAC | Performed by: DERMATOLOGY

## 2018-02-20 PROCEDURE — 99499 UNLISTED E&M SERVICE: CPT | Mod: S$PBB,,, | Performed by: DERMATOLOGY

## 2018-02-20 PROCEDURE — 11101 PR BIOPSY, EACH ADDED LESION: CPT | Mod: S$PBB,,, | Performed by: DERMATOLOGY

## 2018-02-20 PROCEDURE — 17003 DESTRUCT PREMALG LES 2-14: CPT | Mod: PBBFAC | Performed by: DERMATOLOGY

## 2018-02-20 PROCEDURE — 17003 DESTRUCT PREMALG LES 2-14: CPT | Mod: S$PBB,,, | Performed by: DERMATOLOGY

## 2018-02-20 PROCEDURE — 17000 DESTRUCT PREMALG LESION: CPT | Mod: S$PBB,,, | Performed by: DERMATOLOGY

## 2018-02-20 PROCEDURE — 99213 OFFICE O/P EST LOW 20 MIN: CPT | Mod: PBBFAC | Performed by: DERMATOLOGY

## 2018-02-20 PROCEDURE — 11100 PR BIOPSY OF SKIN LESION: CPT | Mod: 59,S$PBB,, | Performed by: DERMATOLOGY

## 2018-02-20 PROCEDURE — 88305 TISSUE EXAM BY PATHOLOGIST: CPT | Performed by: PATHOLOGY

## 2018-02-20 PROCEDURE — 11101 PR BIOPSY, EACH ADDED LESION: CPT | Mod: 59,PBBFAC | Performed by: DERMATOLOGY

## 2018-02-20 NOTE — PATIENT INSTRUCTIONS
Shave Biopsy Wound Care    Your doctor has performed a shave biopsy today.  A band aid and vaseline ointment has been placed over the site.  This should remain in place for 24 hours.  It is recommended that you keep the area dry for the first 24 hours.  After 24 hours, you may remove the band aid and wash the area with warm soap and water and apply Vaseline jelly.  Many patients prefer to use Neosporin or Bacitracin ointment.  This is acceptable; however, know that you can develop an allergy to this medication even if you have used it safely for years.  It is important to keep the area moist.  Letting it dry out and get air slows healing time, and will worsen the scar.  Band aid is optional after first 24 hours.      If you notice increasing redness, tenderness, pain, or yellow drainage at the biopsy site, please notify your doctor.  These are signs of an infection.    If your biopsy site is bleeding, apply firm pressure for 15 minutes straight.  Repeat for another 15 minutes, if it is still bleeding.   If the surgical site continues to bleed, then please contact your doctor.      [facility] [place] [address]/ [phone] FAX/ [email or web address]      CRYOSURGERY      Your doctor has used a method called cryosurgery to treat your skin condition. Cryosurgery refers to the use of very cold substances to treat a variety of skin conditions such as warts, pre-skin cancers, molluscum contagiosum, sun spots, and several benign growths. The substance we use in cryosurgery is liquid nitrogen and is so cold (-195 degrees Celsius) that is burns when administered.     Following treatment in the office, the skin may immediately burn and become red. You may find the area around the lesion is affected as well. It is sometimes necessary to treat not only the lesion, but a small area of the surrounding normal skin to achieve a good response.     A blister, and even a blood filled blister, may form  after treatment.   This is a normal response. If the blister is painful, it is acceptable to sterilize a needle and with rubbing alcohol and gently pop the blister. It is important that you gently wash the area with soap and warm water as the blister fluid may contain wart virus if a wart was treated. Do no remove the roof of the blister.     The area treated can take anywhere from 1-3 weeks to heal. Healing time depends on the kind skin lesion treated, the location, and how aggressively the lesion was treated. It is recommended that the areas treated are covered with Vaseline or bacitracin ointment and a band-aid. If a band-aid is not practical, just ointment applied several times per day will do. Keeping these areas moist will speed the healing time.    Treatment with liquid nitrogen can leave a scar. In dark skin, it may be a light or dark scar, in light skin it may be a white or pink scar. These will generally fade with time, but may never go away completely.     If you have any concerns after your treatment, please feel free to call the office.       [facility] [place] [address]/ [phone] FAX/ [email or web address]

## 2018-02-20 NOTE — PROGRESS NOTES
Subjective:       Patient ID:  Arie Salinas is a 89 y.o. male who presents for   Chief Complaint   Patient presents with    Skin Check     recheck scalp     History of Present Illness: The patient presents for follow up of skin check.    The patient was last seen on: 11/13/17 for cryosurgery to actinic keratoses which have resolved.   This is a high risk patient here to check for the development of new lesions.    Other skin complaints: none          Review of Systems   Skin: Positive for activity-related sunscreen use and wears hat (always). Negative for daily sunscreen use and recent sunburn.   Hematologic/Lymphatic: Bruises/bleeds easily (on eloquis).        Objective:    Physical Exam   Constitutional: He appears well-developed and well-nourished. No distress.   Neurological: He is alert and oriented to person, place, and time. He is not disoriented.   Psychiatric: He has a normal mood and affect.   Skin:   Areas Examined (abnormalities noted in diagram):   Scalp / Hair Palpated and Inspected  Head / Face Inspection Performed  RUE Inspected  LUE Inspection Performed                  Diagram Legend     Erythematous scaling macule/papule c/w actinic keratosis       Vascular papule c/w angioma      Pigmented verrucoid papule/plaque c/w seborrheic keratosis      Yellow umbilicated papule c/w sebaceous hyperplasia      Irregularly shaped tan macule c/w lentigo     1-2 mm smooth white papules consistent with Milia      Movable subcutaneous cyst with punctum c/w epidermal inclusion cyst      Subcutaneous movable cyst c/w pilar cyst      Firm pink to brown papule c/w dermatofibroma      Pedunculated fleshy papule(s) c/w skin tag(s)      Evenly pigmented macule c/w junctional nevus     Mildly variegated pigmented, slightly irregular-bordered macule c/w mildly atypical nevus      Flesh colored to evenly pigmented papule c/w intradermal nevus       Pink pearly papule/plaque c/w basal cell carcinoma       Erythematous hyperkeratotic cursted plaque c/w SCC      Surgical scar with no sign of skin cancer recurrence      Open and closed comedones      Inflammatory papules and pustules      Verrucoid papule consistent consistent with wart     Erythematous eczematous patches and plaques     Dystrophic onycholytic nail with subungual debris c/w onychomycosis     Umbilicated papule    Erythematous-base heme-crusted tan verrucoid plaque consistent with inflamed seborrheic keratosis     Erythematous Silvery Scaling Plaque c/w Psoriasis     See annotation                  Assessment / Plan:      Pathology Orders:     Normal Orders This Visit    Tissue Specimen To Pathology, Dermatology     Questions:    Directional Terms:  Other(comment)    Clinical information:  r/o bcc vs scc    Specific Site:  right ear helix    Tissue Specimen To Pathology, Dermatology     Questions:    Directional Terms:  Other(comment)    Clinical information:  r/o scc vs hak    Specific Site:  scalp    Tissue Specimen To Pathology, Dermatology     Questions:    Directional Terms:  Other(comment)    Clinical information:  r/o bcc    Specific Site:  left forehead        Neoplasm of uncertain behavior of skin  -     Tissue Specimen To Pathology, Dermatology  -     Tissue Specimen To Pathology, Dermatology  -     Tissue Specimen To Pathology, Dermatology  Shave biopsy procedure note:    Shave biopsy x 3 performed after verbal consent including risk of infection, scar, recurrence, need for additional treatment of site. Area prepped with alcohol, anesthetized with approximately 1.0cc of 1% lidocaine with epinephrine. Lesional tissue shaved with razor blade. Hemostasis achieved with application of aluminum chloride followed by hyfrecation. No complications. Dressing applied. Wound care explained.    If biopsy positive for malignancy, refer to Dr. Jernigan for Mohs surgery consultation.  Has had fernandes do mohs in past    Actinic keratosis  Cryosurgery Procedure  Note    Verbal consent from the patient is obtained and the patient is aware of the precancerous quality and need for treatment of these lesions. Liquid nitrogen cryosurgery is applied to the 6 actinic keratoses, as detailed in the physical exam, to produce a freeze injury. The patient is aware that blisters may form and is instructed on wound care with gentle cleansing and use of vaseline ointment to keep moist until healed. The patient is supplied a handout on cryosurgery and is instructed to call if lesions do not completely resolve.               Follow-up if symptoms worsen or fail to improve.

## 2018-02-26 ENCOUNTER — LAB VISIT (OUTPATIENT)
Dept: LAB | Facility: HOSPITAL | Age: 83
End: 2018-02-26
Attending: INTERNAL MEDICINE
Payer: MEDICARE

## 2018-02-26 DIAGNOSIS — I10 ESSENTIAL HYPERTENSION: ICD-10-CM

## 2018-02-26 DIAGNOSIS — I73.9 PAD (PERIPHERAL ARTERY DISEASE): ICD-10-CM

## 2018-02-26 DIAGNOSIS — I34.0 NON-RHEUMATIC MITRAL REGURGITATION: Chronic | ICD-10-CM

## 2018-02-26 DIAGNOSIS — E78.2 MIXED HYPERLIPIDEMIA: Chronic | ICD-10-CM

## 2018-02-26 DIAGNOSIS — I25.10 CORONARY ARTERY DISEASE INVOLVING NATIVE CORONARY ARTERY OF NATIVE HEART WITHOUT ANGINA PECTORIS: Chronic | ICD-10-CM

## 2018-02-26 DIAGNOSIS — I70.1 RENAL ARTERY STENOSIS: Chronic | ICD-10-CM

## 2018-02-26 DIAGNOSIS — R09.89 RIGHT CAROTID BRUIT: ICD-10-CM

## 2018-02-26 DIAGNOSIS — I50.33 ACUTE ON CHRONIC DIASTOLIC HEART FAILURE: ICD-10-CM

## 2018-02-26 DIAGNOSIS — N18.30 CKD (CHRONIC KIDNEY DISEASE) STAGE 3, GFR 30-59 ML/MIN: Chronic | ICD-10-CM

## 2018-02-26 DIAGNOSIS — I49.3 PVC'S (PREMATURE VENTRICULAR CONTRACTIONS): Chronic | ICD-10-CM

## 2018-02-26 DIAGNOSIS — I48.0 PAROXYSMAL ATRIAL FIBRILLATION: ICD-10-CM

## 2018-02-26 DIAGNOSIS — Z79.899 ON AMIODARONE THERAPY: ICD-10-CM

## 2018-02-26 LAB
ALBUMIN SERPL BCP-MCNC: 3.1 G/DL
ALP SERPL-CCNC: 99 U/L
ALT SERPL W/O P-5'-P-CCNC: 24 U/L
ANION GAP SERPL CALC-SCNC: 10 MMOL/L
AST SERPL-CCNC: 25 U/L
BILIRUB SERPL-MCNC: 0.9 MG/DL
BUN SERPL-MCNC: 21 MG/DL
CALCIUM SERPL-MCNC: 8.9 MG/DL
CHLORIDE SERPL-SCNC: 106 MMOL/L
CHOLEST SERPL-MCNC: 101 MG/DL
CHOLEST/HDLC SERPL: 2.3 {RATIO}
CO2 SERPL-SCNC: 22 MMOL/L
CREAT SERPL-MCNC: 1.5 MG/DL
EST. GFR  (AFRICAN AMERICAN): 46.7 ML/MIN/1.73 M^2
EST. GFR  (NON AFRICAN AMERICAN): 40.4 ML/MIN/1.73 M^2
GLUCOSE SERPL-MCNC: 94 MG/DL
HDLC SERPL-MCNC: 44 MG/DL
HDLC SERPL: 43.6 %
LDLC SERPL CALC-MCNC: 47 MG/DL
NONHDLC SERPL-MCNC: 57 MG/DL
POTASSIUM SERPL-SCNC: 4.8 MMOL/L
PROT SERPL-MCNC: 6.2 G/DL
SODIUM SERPL-SCNC: 138 MMOL/L
TRIGL SERPL-MCNC: 50 MG/DL
TSH SERPL DL<=0.005 MIU/L-ACNC: 1.12 UIU/ML

## 2018-02-26 PROCEDURE — 80053 COMPREHEN METABOLIC PANEL: CPT

## 2018-02-26 PROCEDURE — 80061 LIPID PANEL: CPT

## 2018-02-26 PROCEDURE — 36415 COLL VENOUS BLD VENIPUNCTURE: CPT | Mod: PO

## 2018-02-26 PROCEDURE — 84443 ASSAY THYROID STIM HORMONE: CPT

## 2018-02-28 ENCOUNTER — TELEPHONE (OUTPATIENT)
Dept: CARDIOLOGY | Facility: CLINIC | Age: 83
End: 2018-02-28

## 2018-03-05 ENCOUNTER — OFFICE VISIT (OUTPATIENT)
Dept: CARDIOLOGY | Facility: CLINIC | Age: 83
End: 2018-03-05
Payer: MEDICARE

## 2018-03-05 VITALS
DIASTOLIC BLOOD PRESSURE: 65 MMHG | HEART RATE: 55 BPM | HEIGHT: 71 IN | SYSTOLIC BLOOD PRESSURE: 142 MMHG | WEIGHT: 169 LBS | BODY MASS INDEX: 23.66 KG/M2

## 2018-03-05 DIAGNOSIS — I10 ESSENTIAL HYPERTENSION: ICD-10-CM

## 2018-03-05 DIAGNOSIS — I48.0 PAROXYSMAL ATRIAL FIBRILLATION: ICD-10-CM

## 2018-03-05 DIAGNOSIS — I70.1 RENAL ARTERY STENOSIS: Chronic | ICD-10-CM

## 2018-03-05 DIAGNOSIS — I49.3 PVC'S (PREMATURE VENTRICULAR CONTRACTIONS): Chronic | ICD-10-CM

## 2018-03-05 DIAGNOSIS — I51.89 DIASTOLIC DYSFUNCTION: ICD-10-CM

## 2018-03-05 DIAGNOSIS — I25.10 CORONARY ARTERY DISEASE INVOLVING NATIVE CORONARY ARTERY OF NATIVE HEART WITHOUT ANGINA PECTORIS: Primary | Chronic | ICD-10-CM

## 2018-03-05 DIAGNOSIS — E78.5 HYPERLIPIDEMIA, UNSPECIFIED HYPERLIPIDEMIA TYPE: Chronic | ICD-10-CM

## 2018-03-05 DIAGNOSIS — N18.30 CKD (CHRONIC KIDNEY DISEASE) STAGE 3, GFR 30-59 ML/MIN: Chronic | ICD-10-CM

## 2018-03-05 DIAGNOSIS — I34.0 NON-RHEUMATIC MITRAL REGURGITATION: Chronic | ICD-10-CM

## 2018-03-05 DIAGNOSIS — I65.23 BILATERAL CAROTID ARTERY STENOSIS: Primary | ICD-10-CM

## 2018-03-05 DIAGNOSIS — I73.9 PAD (PERIPHERAL ARTERY DISEASE): ICD-10-CM

## 2018-03-05 DIAGNOSIS — R09.89 RIGHT CAROTID BRUIT: ICD-10-CM

## 2018-03-05 DIAGNOSIS — Z79.899 ON AMIODARONE THERAPY: ICD-10-CM

## 2018-03-05 PROBLEM — R11.2 NAUSEA & VOMITING: Status: RESOLVED | Noted: 2018-01-02 | Resolved: 2018-03-05

## 2018-03-05 PROBLEM — R19.7 DIARRHEA: Status: RESOLVED | Noted: 2018-01-02 | Resolved: 2018-03-05

## 2018-03-05 PROCEDURE — 99213 OFFICE O/P EST LOW 20 MIN: CPT | Mod: PBBFAC,PO | Performed by: INTERNAL MEDICINE

## 2018-03-05 PROCEDURE — 99214 OFFICE O/P EST MOD 30 MIN: CPT | Mod: S$PBB,,, | Performed by: INTERNAL MEDICINE

## 2018-03-05 PROCEDURE — 99999 PR PBB SHADOW E&M-EST. PATIENT-LVL III: CPT | Mod: PBBFAC,,, | Performed by: INTERNAL MEDICINE

## 2018-03-05 NOTE — PROGRESS NOTES
Subjective:   Patient ID:  Arie Salinas is a 89 y.o. male who presents for follow-up of Hypertension      HPI: Patient is more sedentary, but has no specific complaints or symptoms.  Recently seen by Dr. Belle for PAF. No changes in the medical regimen wee made.  Recent blood owrk indicate low LDL-C.  IN the past we have recommended decreasing Atorvastatin to 10 mg, but patient did not make the change.    He and his wife are in the process of moving to [facility] to be close to their daughter and son-in-law    Lab Results   Component Value Date     02/26/2018    K 4.8 02/26/2018     02/26/2018    CO2 22 (L) 02/26/2018    BUN 21 02/26/2018    CREATININE 1.5 (H) 02/26/2018    GLU 94 02/26/2018    HGBA1C 6.1 06/30/2016    MG 1.8 01/02/2018    AST 25 02/26/2018    ALT 24 02/26/2018    ALBUMIN 3.1 (L) 02/26/2018    PROT 6.2 02/26/2018    BILITOT 0.9 02/26/2018    WBC 13.09 (H) 01/02/2018    HGB 11.2 (L) 01/02/2018    HCT 34.1 (L) 01/02/2018    MCV 90 01/02/2018     01/02/2018    INR 1.0 02/22/2017    PSA 0.80 01/06/2012    TSH 1.122 02/26/2018    CHOL 101 (L) 02/26/2018    HDL 44 02/26/2018    LDLCALC 47.0 (L) 02/26/2018    TRIG 50 02/26/2018       Review of Systems   Constitution: Positive for weight loss.   HENT: Negative.    Eyes: Negative.    Cardiovascular: Positive for leg swelling. Negative for chest pain, claudication, dyspnea on exertion, irregular heartbeat, near-syncope, palpitations and syncope.   Respiratory: Negative.  Negative for cough, shortness of breath, snoring and wheezing.    Endocrine: Negative.  Negative for cold intolerance, heat intolerance, polydipsia, polyphagia and polyuria.   Skin: Negative.    Musculoskeletal: Positive for arthritis, back pain and muscle weakness.   Gastrointestinal: Negative.    Genitourinary: Negative.    Neurological: Negative.    Psychiatric/Behavioral: Negative.        Objective:   Physical Exam   Constitutional: He is oriented to person,  "place, and time. He appears well-developed and well-nourished.   BP (!) 142/65   Pulse (!) 55   Ht 5' 10.5" (1.791 m)   Wt 76.6 kg (168 lb 15.7 oz)   BMI 23.90 kg/m²      HENT:   Head: Normocephalic.   Eyes: Pupils are equal, round, and reactive to light.   Neck: Normal range of motion. Neck supple. Carotid bruit is not present. No thyromegaly present.   Cardiovascular: Normal rate, regular rhythm and intact distal pulses.   Occasional extrasystoles are present. Exam reveals no gallop and no friction rub.    Murmur heard.   Holosystolic murmur is present with a grade of 1/6  at the apex  Pulses:       Carotid pulses are 2+ on the right side, and 2+ on the left side.       Radial pulses are 2+ on the right side, and 2+ on the left side.        Femoral pulses are 2+ on the right side, and 2+ on the left side.       Popliteal pulses are 2+ on the right side, and 2+ on the left side.        Dorsalis pedis pulses are 2+ on the right side, and 2+ on the left side.        Posterior tibial pulses are 2+ on the right side, and 2+ on the left side.   Pulmonary/Chest: Effort normal and breath sounds normal. No respiratory distress. He has no wheezes. He has no rales. He exhibits no tenderness.   Abdominal: Soft. Bowel sounds are normal.   Musculoskeletal: Normal range of motion. He exhibits edema.   +1 bilateral   Neurological: He is alert and oriented to person, place, and time.   Skin: Skin is warm and dry.   Psychiatric: He has a normal mood and affect.   Nursing note and vitals reviewed.        Assessment and Plan:     Problem List Items Addressed This Visit        Cardiology Problems    Hyperlipidemia (Chronic)    Relevant Orders    Hepatic function panel    Lipid panel    Coronary artery disease - Primary (Chronic)    Mitral valve regurgitation (Chronic)    Renal artery stenosis (Chronic)    PVC's (premature ventricular contractions) (Chronic)    PAD (peripheral artery disease)    Atrial fibrillation    Essential " hypertension       Other    CKD (chronic kidney disease) stage 3, GFR 30-59 ml/min (Chronic)    History of renal stent    Right carotid bruit    On amiodarone therapy    Diastolic dysfunction          Patient's Medications   New Prescriptions    No medications on file   Previous Medications    ALLOPURINOL (ZYLOPRIM) 300 MG TABLET    Take 1 tablet (300 mg total) by mouth once daily.    AMIODARONE (PACERONE) 200 MG TAB    TAKE 1 TABLET EVERY DAY    ASPIRIN 81 MG TAB    Take 81 mg by mouth Daily.    ATORVASTATIN (LIPITOR) 20 MG TABLET    TAKE 1 TABLET ONE TIME DAILY    ELIQUIS 2.5 MG TAB    TAKE 1 TABLET TWICE DAILY    LISINOPRIL 10 MG TABLET    Take 1 tablet (10 mg total) by mouth once daily.    METOPROLOL SUCCINATE (TOPROL-XL) 50 MG 24 HR TABLET    Take 1 tablet (50 mg total) by mouth once daily.    MULTIVITAMIN-MINERALS-LUTEIN (CENTRUM SILVER) TAB    Take 1 tablet by mouth Daily.    TAMSULOSIN (FLOMAX) 0.4 MG CP24    TAKE 1 CAPSULE EVERY DAY   Modified Medications    No medications on file   Discontinued Medications    No medications on file   Decrease Atorvastatin to 10  Mg QD and repeat lipids and LFT's sometime in May  [facility]  No Follow-up on file.

## 2018-03-07 DIAGNOSIS — I48.0 PAROXYSMAL ATRIAL FIBRILLATION: ICD-10-CM

## 2018-03-07 RX ORDER — AMIODARONE HYDROCHLORIDE 200 MG/1
200 TABLET ORAL DAILY
Qty: 90 TABLET | Refills: 3 | Status: SHIPPED | OUTPATIENT
Start: 2018-03-07

## 2018-03-09 ENCOUNTER — CLINICAL SUPPORT (OUTPATIENT)
Dept: CARDIOLOGY | Facility: CLINIC | Age: 83
End: 2018-03-09
Attending: INTERNAL MEDICINE
Payer: MEDICARE

## 2018-03-09 DIAGNOSIS — I65.23 BILATERAL CAROTID ARTERY STENOSIS: ICD-10-CM

## 2018-03-09 LAB — INTERNAL CAROTID STENOSIS: ABNORMAL

## 2018-03-09 PROCEDURE — 93880 EXTRACRANIAL BILAT STUDY: CPT | Mod: PBBFAC,PO | Performed by: INTERNAL MEDICINE

## 2018-03-12 ENCOUNTER — INITIAL CONSULT (OUTPATIENT)
Dept: DERMATOLOGY | Facility: CLINIC | Age: 83
End: 2018-03-12
Payer: MEDICARE

## 2018-03-12 VITALS
SYSTOLIC BLOOD PRESSURE: 155 MMHG | DIASTOLIC BLOOD PRESSURE: 77 MMHG | HEIGHT: 71 IN | HEART RATE: 58 BPM | WEIGHT: 168 LBS | BODY MASS INDEX: 23.52 KG/M2

## 2018-03-12 DIAGNOSIS — C44.212 BASAL CELL CARCINOMA (BCC) OF SKIN OF RIGHT EAR: Primary | ICD-10-CM

## 2018-03-12 PROCEDURE — 99214 OFFICE O/P EST MOD 30 MIN: CPT | Mod: S$PBB,,, | Performed by: DERMATOLOGY

## 2018-03-12 PROCEDURE — 99213 OFFICE O/P EST LOW 20 MIN: CPT | Mod: PBBFAC | Performed by: DERMATOLOGY

## 2018-03-12 PROCEDURE — 99999 PR PBB SHADOW E&M-EST. PATIENT-LVL III: CPT | Mod: PBBFAC,,, | Performed by: DERMATOLOGY

## 2018-03-12 RX ORDER — DIAZEPAM 2 MG/1
2 TABLET ORAL SEE ADMIN INSTRUCTIONS
Qty: 2 TABLET | Refills: 0 | Status: SHIPPED | OUTPATIENT
Start: 2018-03-12 | End: 2018-05-15

## 2018-03-12 NOTE — PROGRESS NOTES
REFERRING MD:  Catherine Ascencio M.D.    CHIEF COMPLAINT:  New patient being consulted for Mohs' surgery evaluation.    HISTORY OF PRESENT ILLNESS:  89 y.o. male presents with a 1+ year(s) history of growth on the R ear helix. (+) scaly. (+) tender. Noticed blood on pillow.     Negative for scabbing.  Positive for crusting.  Positive for bleeding.  Negative for itching.    Biopsy consistent with basal cell carcinoma.     No prior treatment.    Pacemaker: No  Defibrillator: No  Artificial joints: No  Artificial heart valves: No    PAST MEDICAL HISTORY:  Past Medical History:   Diagnosis Date    Allergy     Arthritis     Atrial fibrillation 12/4/2015    Basal cell carcinoma     Chronic diastolic heart failure     Colon polyp     Coronary artery disease     Elevated PSA     Hyperlipidemia     Hypertension     Moderate mitral regurgitation     Prostate cancer     PVD (peripheral vascular disease)     Squamous cell carcinoma        PAST SURGICAL HISTORY:  Past Surgical History:   Procedure Laterality Date    COLON SURGERY      radiation      prostate cancer    S/P BILATERAL RENAL STENT  04/08/2004    PTCA    TONSILLECTOMY          SOCIAL HISTORY:  Dependencies:  former smoker    PERTINENT MEDICATIONS:  See medications list.  aspirin and Apixaban    ALLERGIES:  Iodine    ROS:  Skin: See HPI  Constitutional: No fatigue, fever, malaise, weight loss, or night sweats.  Cardiovascular: No chest pain, palpitations, or edema.  Respiratory: No coughing, wheezing, SOB, or sputum production.    Physical Exam   HENT:   Ears:          General: Mood and affect normal. Alert and orient X3. Normal appearance.  Eyelids:  no suspicious lesions  Head/Face: R ear inferior helix with an ill-defined 7 x 8 mm pink pearly plaque located 5 cm inferiorly from the right superior ear attachment and 4 cm superiorly from the right inferior lobe attachment.   Lips/Teeth/Gums:  no suspicious lesions     IMPRESSION:  Biopsy proven  basal cell carcinoma, R ear [place], path# [id number].    PLAN:  The diagnosis and the pathology report were discussed in detail with the patient. Treatment options were reviewed, including Mohs Micrographic Surgery, radiation, topical therapy, and standard excision.  After careful review of patient's history and physical exam, and after discussion of treatment options, the decision was made to perform Mohs micrographic surgery.    Scheduled patient for Mohs Micrographic Surgery. Risks, benefits, and alternatives of Mohs' surgery discussed with the patient. Discussed repair options including complex closure, skin flap, skin graft and second intention healing with the patient. Pre-operative instructions provided to the patient. Okay to stay on aspirin and Eliquis. Patient was given Rx for Valium 2 mg. Instructed patient to have someone drive them on day of surgery.     Spent 30 minutes in coordination of care and/or consultation with patient discussing diagnosis, treatment options, risks and benefits of each. All questions answered.

## 2018-03-13 ENCOUNTER — TELEPHONE (OUTPATIENT)
Dept: CARDIOLOGY | Facility: CLINIC | Age: 83
End: 2018-03-13

## 2018-03-13 NOTE — TELEPHONE ENCOUNTER
----- Message from Kerri Gonzalez MD sent at 3/13/2018  7:01 AM CDT -----  Mr. Salinas, please review results of your carotid duplex. It shows moderate disease on the right and mild on the left. I think it should be repeated within 6 months and you might need a great vessel CT in near  Future.  We will mail you the resutls, so you can carry a paper copy with you when you relocate

## 2018-03-15 ENCOUNTER — TELEPHONE (OUTPATIENT)
Dept: DERMATOLOGY | Facility: CLINIC | Age: 83
End: 2018-03-15

## 2018-03-15 NOTE — TELEPHONE ENCOUNTER
Spoke to pt daughter (Duyen) and she stated that she has to cancel the pt Mohs appointment that was scheduled for 3/26/18, at 7:30 for BCC right ear helix. Pt daughter (Duyen) stated that the appointment conflicted with another appointment that the pt had scheduled. She stated that she will call us back when ready to schedule his Mohs procedure. She appreciated the call back.

## 2018-03-15 NOTE — TELEPHONE ENCOUNTER
----- Message from Mariela Courtney sent at 3/15/2018  1:30 PM CDT -----  Contact: pts daughter Duyen Jernigan-pt - pts daughter is calling to speak with the nurse pt is scheduled for Moh's surgery on March 26 they need to cancel the appt for now this appt is in conflict  With another appt can you please call Duyen at [phone] jc

## 2018-03-23 DIAGNOSIS — N40.0 PROSTATISM: ICD-10-CM

## 2018-03-23 RX ORDER — TAMSULOSIN HYDROCHLORIDE 0.4 MG/1
1 CAPSULE ORAL DAILY
Qty: 90 CAPSULE | Refills: 0 | Status: SHIPPED | OUTPATIENT
Start: 2018-03-23

## 2018-03-23 NOTE — TELEPHONE ENCOUNTER
----- Message from Eva Rushing sent at 3/23/2018  1:04 PM CDT -----  Contact: Patient [phone]  Prescription Request:     Name of medication: tamsulosin (FLOMAX) 0.4 mg Cp24    Reason for request: Refill    Pharmacy: [facility] - [address]    Please advise.    Thank You

## 2018-04-12 ENCOUNTER — TELEPHONE (OUTPATIENT)
Dept: INTERNAL MEDICINE | Facility: CLINIC | Age: 83
End: 2018-04-12

## 2018-04-12 NOTE — TELEPHONE ENCOUNTER
----- Message from Anusha Harris sent at 4/11/2018  1:04 PM CDT -----  Contact: Duyen/Daughter/[phone]  Patient's daughter will like to speak to Hortensia in regards to both parents about some appointments the patient's daughter would like to talk to Hortensia to go into detail.

## 2018-04-17 ENCOUNTER — LAB VISIT (OUTPATIENT)
Dept: LAB | Facility: HOSPITAL | Age: 83
End: 2018-04-17
Attending: INTERNAL MEDICINE
Payer: MEDICARE

## 2018-04-17 DIAGNOSIS — E78.5 HYPERLIPIDEMIA, UNSPECIFIED HYPERLIPIDEMIA TYPE: Chronic | ICD-10-CM

## 2018-04-17 LAB
ALBUMIN SERPL BCP-MCNC: 3.1 G/DL
ALP SERPL-CCNC: 100 U/L
ALT SERPL W/O P-5'-P-CCNC: 26 U/L
AST SERPL-CCNC: 27 U/L
BILIRUB DIRECT SERPL-MCNC: 0.4 MG/DL
BILIRUB SERPL-MCNC: 0.8 MG/DL
CHOLEST SERPL-MCNC: 92 MG/DL
CHOLEST/HDLC SERPL: 2.2 {RATIO}
HDLC SERPL-MCNC: 42 MG/DL
HDLC SERPL: 45.7 %
LDLC SERPL CALC-MCNC: 41 MG/DL
NONHDLC SERPL-MCNC: 50 MG/DL
PROT SERPL-MCNC: 5.9 G/DL
TRIGL SERPL-MCNC: 45 MG/DL

## 2018-04-17 PROCEDURE — 36415 COLL VENOUS BLD VENIPUNCTURE: CPT | Mod: PO

## 2018-04-17 PROCEDURE — 80076 HEPATIC FUNCTION PANEL: CPT

## 2018-04-17 PROCEDURE — 80061 LIPID PANEL: CPT

## 2018-04-18 ENCOUNTER — TELEPHONE (OUTPATIENT)
Dept: CARDIOLOGY | Facility: CLINIC | Age: 83
End: 2018-04-18

## 2018-04-18 ENCOUNTER — TELEPHONE (OUTPATIENT)
Dept: DERMATOLOGY | Facility: CLINIC | Age: 83
End: 2018-04-18

## 2018-04-18 DIAGNOSIS — E78.5 HYPERLIPIDEMIA, UNSPECIFIED HYPERLIPIDEMIA TYPE: Primary | ICD-10-CM

## 2018-04-18 NOTE — TELEPHONE ENCOUNTER
----- Message from Kerri Gonzalez MD sent at 4/18/2018 12:11 PM CDT -----  Mr. Salinas. Repeat blood work indicates decreases proteins in your body and continued low cholesterol.  I think we should stop Lipitor for now and repeat blood work in 2 months.  You may wish to address your nutritional status and have full physical with Dr. Mendes before you leave for [place].  Good luck with the relocation.

## 2018-04-18 NOTE — TELEPHONE ENCOUNTER
----- Message from Sepideh Guillaume sent at 4/18/2018 10:01 AM CDT -----  Contact: patient daughter  ольга  Please call above patient daughter at this number need to speak with the hivne-[phone]-Duyen Simpson

## 2018-04-18 NOTE — TELEPHONE ENCOUNTER
Pt daughter (Duyen) called and stated that she still is not able to scheduled Mohs surgery for pt due to infection from dental work. Pt was seen in clinic 3/12/18 for consult on BCC right ear helix. Pt daughter (Duyen) stated that once pt is release from dentist she will call back to scheduled Mohs.

## 2018-05-10 ENCOUNTER — TELEPHONE (OUTPATIENT)
Dept: CARDIOLOGY | Facility: CLINIC | Age: 83
End: 2018-05-10

## 2018-05-10 NOTE — TELEPHONE ENCOUNTER
Pt called asking for a refill on Amlodipine. I advised pt that I didn't see that listed on his medcard. Pt stated that he was mistaken and has not been taking Amlodipine and that it was an old bottle that he had found. Pt asked if I would go over his meds that we had listed in his chart. I read each medication listed on pt's medcard to pt. Pt verbalized understanding.

## 2018-05-15 ENCOUNTER — OFFICE VISIT (OUTPATIENT)
Dept: INTERNAL MEDICINE | Facility: CLINIC | Age: 83
End: 2018-05-15
Payer: MEDICARE

## 2018-05-15 ENCOUNTER — LAB VISIT (OUTPATIENT)
Dept: LAB | Facility: HOSPITAL | Age: 83
End: 2018-05-15
Payer: MEDICARE

## 2018-05-15 VITALS
OXYGEN SATURATION: 96 % | WEIGHT: 166.69 LBS | SYSTOLIC BLOOD PRESSURE: 116 MMHG | DIASTOLIC BLOOD PRESSURE: 58 MMHG | HEIGHT: 70 IN | BODY MASS INDEX: 23.86 KG/M2 | HEART RATE: 55 BPM

## 2018-05-15 DIAGNOSIS — N18.30 CKD (CHRONIC KIDNEY DISEASE) STAGE 3, GFR 30-59 ML/MIN: Chronic | ICD-10-CM

## 2018-05-15 DIAGNOSIS — Z11.1 SCREENING FOR TUBERCULOSIS: ICD-10-CM

## 2018-05-15 DIAGNOSIS — N18.30 ANEMIA OF CHRONIC RENAL FAILURE, STAGE 3 (MODERATE): Chronic | ICD-10-CM

## 2018-05-15 DIAGNOSIS — I10 ESSENTIAL HYPERTENSION: ICD-10-CM

## 2018-05-15 DIAGNOSIS — Z79.899 ON AMIODARONE THERAPY: ICD-10-CM

## 2018-05-15 DIAGNOSIS — E78.5 HYPERLIPIDEMIA, UNSPECIFIED HYPERLIPIDEMIA TYPE: Primary | Chronic | ICD-10-CM

## 2018-05-15 DIAGNOSIS — D63.1 ANEMIA OF CHRONIC RENAL FAILURE, STAGE 3 (MODERATE): Chronic | ICD-10-CM

## 2018-05-15 DIAGNOSIS — I25.10 CORONARY ARTERY DISEASE INVOLVING NATIVE CORONARY ARTERY OF NATIVE HEART WITHOUT ANGINA PECTORIS: Chronic | ICD-10-CM

## 2018-05-15 DIAGNOSIS — M1A.0720 IDIOPATHIC CHRONIC GOUT OF LEFT ANKLE WITHOUT TOPHUS: ICD-10-CM

## 2018-05-15 DIAGNOSIS — C61 PROSTATE CANCER: ICD-10-CM

## 2018-05-15 DIAGNOSIS — I48.0 PAROXYSMAL ATRIAL FIBRILLATION: ICD-10-CM

## 2018-05-15 PROCEDURE — 36415 COLL VENOUS BLD VENIPUNCTURE: CPT

## 2018-05-15 PROCEDURE — 99999 PR PBB SHADOW E&M-EST. PATIENT-LVL III: CPT | Mod: PBBFAC,,, | Performed by: NURSE PRACTITIONER

## 2018-05-15 PROCEDURE — 99213 OFFICE O/P EST LOW 20 MIN: CPT | Mod: PBBFAC | Performed by: NURSE PRACTITIONER

## 2018-05-15 PROCEDURE — 86480 TB TEST CELL IMMUN MEASURE: CPT

## 2018-05-15 PROCEDURE — 99214 OFFICE O/P EST MOD 30 MIN: CPT | Mod: S$PBB,,, | Performed by: NURSE PRACTITIONER

## 2018-05-15 RX ORDER — LISINOPRIL 10 MG/1
10 TABLET ORAL DAILY
COMMUNITY

## 2018-05-15 NOTE — PROGRESS NOTES
INTERNAL MEDICINE PROGRESS NOTE    CHIEF COMPLAINT     Chief Complaint   Patient presents with    Follow-up       HPI     Arie Salinas is a 89 y.o. male with afib, CAD, renal artery stenosis, PAD, HLD, HTN, CKD, gout, sciatic and h/o prostate cancer who presents for a follow up visit today. Pt and wife are moving back to [facility]. Needs nursing home papers signed.     HTN- compliant with lisinopril and metoprolol. No lightheadedness,dizziness, headaches.     HLD- advised to take atorvastatin 10mg nightly stopped by patient      Afib- compliant with amiodarone and anticoagulated with eliquis 2.5mg. Followed by cardiology Dr Gonzalez and Dr. Belle.     Gout- compliant with allopurinol     H/o prostate cancer - compliant with flomax     Past Medical History:  Past Medical History:   Diagnosis Date    Allergy     Arthritis     Atrial fibrillation 12/4/2015    Basal cell carcinoma     Chronic diastolic heart failure     Colon polyp     Coronary artery disease     Elevated PSA     Hyperlipidemia     Hypertension     Moderate mitral regurgitation     Prostate cancer     PVD (peripheral vascular disease)     Squamous cell carcinoma        Home Medications:  Prior to Admission medications    Medication Sig Start Date End Date Taking? Authorizing Provider   allopurinol (ZYLOPRIM) 300 MG tablet Take 1 tablet (300 mg total) by mouth once daily. 1/29/18   Collin Mendes Jr., MD   amiodarone (PACERONE) 200 MG Tab Take 1 tablet (200 mg total) by mouth once daily. 3/7/18   Kerri Gonzalez MD   apixaban (ELIQUIS) 2.5 mg Tab Take 1 tablet (2.5 mg total) by mouth 2 (two) times daily. 3/7/18   Kerri Gonzalez MD   aspirin 81 mg Tab Take 81 mg by mouth Daily.    Historical Provider, MD   diazePAM (VALIUM) 2 MG tablet Take 1 tablet (2 mg total) by mouth As instructed for Anxiety. Take 1 pill one hour prior to surgery and bring the remaining pill on day of surgery 3/12/18   Loreto Jernigan  MD   lisinopril 10 MG tablet Take 1 tablet (10 mg total) by mouth once daily. 1/22/18 3/5/18  Collin Mendes Jr., MD   metoprolol succinate (TOPROL-XL) 50 MG 24 hr tablet Take 1 tablet (50 mg total) by mouth once daily. 1/10/18   Kerri Gonzalez MD   multivitamin-minerals-lutein (CENTRUM SILVER) Tab Take 1 tablet by mouth Daily.    Historical Provider, MD   tamsulosin (FLOMAX) 0.4 mg Cp24 Take 1 capsule (0.4 mg total) by mouth once daily. 3/23/18   Chuy Downey MD       Review of Systems:  Review of Systems   Constitutional: Negative for chills, fatigue, fever and unexpected weight change.   HENT: Negative for congestion, ear pain, hearing loss, postnasal drip and sinus pressure.    Eyes: Negative for pain, redness and visual disturbance.   Respiratory: Negative for cough and shortness of breath.    Cardiovascular: Negative for chest pain and palpitations.   Gastrointestinal: Negative for abdominal distention and abdominal pain.   Endocrine: Negative for polydipsia, polyphagia and polyuria.   Genitourinary: Negative for dysuria, frequency, hematuria and urgency.   Musculoskeletal: Negative for arthralgias, gait problem and myalgias.   Skin: Negative for pallor and rash.   Allergic/Immunologic: Negative for environmental allergies and immunocompromised state.   Neurological: Negative for dizziness, weakness, light-headedness and headaches.   Hematological: Negative for adenopathy. Does not bruise/bleed easily.   Psychiatric/Behavioral: Negative for behavioral problems, confusion and sleep disturbance. The patient is not nervous/anxious.        Health Maintainence:   Immunizations:  Health Maintenance       Date Due Completion Date    TETANUS VACCINE 06/15/1945 ---    Zoster Vaccine 06/15/1987 ---    Pneumococcal (65+) (2 of 2 - PPSV23) 11/20/2016 11/20/2015    Influenza Vaccine 08/01/2018 10/2/2017    Lipid Panel 04/17/2023 4/17/2018           PHYSICAL EXAM     BP (!) 116/58 (BP Location: Left arm,  "Patient Position: Sitting, BP Method: Large (Manual))   Pulse (!) 55   Ht 5' 10" (1.778 m)   Wt 75.6 kg (166 lb 10.7 oz)   SpO2 96%   BMI 23.91 kg/m²     Physical Exam   Constitutional: He is oriented to person, place, and time. He appears well-developed and well-nourished.   HENT:   Head: Normocephalic.   Right Ear: External ear normal.   Left Ear: External ear normal.   Nose: Nose normal.   Mouth/Throat: Oropharynx is clear and moist. No oropharyngeal exudate.   Eyes: Pupils are equal, round, and reactive to light.   Neck: No JVD present. No tracheal deviation present. No thyromegaly present.   Cardiovascular: Normal rate, regular rhythm and intact distal pulses.  Exam reveals no gallop and no friction rub.    Murmur heard.  Pulmonary/Chest: Breath sounds normal. No respiratory distress. He has no wheezes. He has no rales. He exhibits no tenderness.   Abdominal: Soft. Bowel sounds are normal. He exhibits no distension. There is no tenderness.   Musculoskeletal: Normal range of motion. He exhibits no edema or tenderness.   Lymphadenopathy:     He has no cervical adenopathy.   Neurological: He is alert and oriented to person, place, and time.   Skin: Skin is warm and dry. No rash noted.   Psychiatric: He has a normal mood and affect. His behavior is normal.   Vitals reviewed.      LABS     Lab Results   Component Value Date    HGBA1C 6.1 06/30/2016     CMP  Sodium   Date Value Ref Range Status   02/26/2018 138 136 - 145 mmol/L Final     Potassium   Date Value Ref Range Status   02/26/2018 4.8 3.5 - 5.1 mmol/L Final     Chloride   Date Value Ref Range Status   02/26/2018 106 95 - 110 mmol/L Final     CO2   Date Value Ref Range Status   02/26/2018 22 (L) 23 - 29 mmol/L Final     Glucose   Date Value Ref Range Status   02/26/2018 94 70 - 110 mg/dL Final     BUN, Bld   Date Value Ref Range Status   02/26/2018 21 8 - 23 mg/dL Final     Creatinine   Date Value Ref Range Status   02/26/2018 1.5 (H) 0.5 - 1.4 mg/dL " Final     Calcium   Date Value Ref Range Status   02/26/2018 8.9 8.7 - 10.5 mg/dL Final     Total Protein   Date Value Ref Range Status   04/17/2018 5.9 (L) 6.0 - 8.4 g/dL Final     Albumin   Date Value Ref Range Status   04/17/2018 3.1 (L) 3.5 - 5.2 g/dL Final     Total Bilirubin   Date Value Ref Range Status   04/17/2018 0.8 0.1 - 1.0 mg/dL Final     Comment:     For infants and newborns, interpretation of results should be based  on gestational age, weight and in agreement with clinical  observations.  Premature Infant recommended reference ranges:  Up to 24 hours.............<8.0 mg/dL  Up to 48 hours............<12.0 mg/dL  3-5 days..................<15.0 mg/dL  6-29 days.................<15.0 mg/dL       Alkaline Phosphatase   Date Value Ref Range Status   04/17/2018 100 55 - 135 U/L Final     AST   Date Value Ref Range Status   04/17/2018 27 10 - 40 U/L Final     ALT   Date Value Ref Range Status   04/17/2018 26 10 - 44 U/L Final     Anion Gap   Date Value Ref Range Status   02/26/2018 10 8 - 16 mmol/L Final     eGFR if    Date Value Ref Range Status   02/26/2018 46.7 (A) >60 mL/min/1.73 m^2 Final     eGFR if non    Date Value Ref Range Status   02/26/2018 40.4 (A) >60 mL/min/1.73 m^2 Final     Comment:     Calculation used to obtain the estimated glomerular filtration  rate (eGFR) is the CKD-EPI equation.        Lab Results   Component Value Date    WBC 13.09 (H) 01/02/2018    HGB 11.2 (L) 01/02/2018    HCT 34.1 (L) 01/02/2018    MCV 90 01/02/2018     01/02/2018     Lab Results   Component Value Date    CHOL 92 (L) 04/17/2018    CHOL 101 (L) 02/26/2018    CHOL 115 (L) 09/25/2017     Lab Results   Component Value Date    HDL 42 04/17/2018    HDL 44 02/26/2018    HDL 40 09/25/2017     Lab Results   Component Value Date    LDLCALC 41.0 (L) 04/17/2018    LDLCALC 47.0 (L) 02/26/2018    LDLCALC 56.6 (L) 09/25/2017     Lab Results   Component Value Date    TRIG 45 04/17/2018     TRIG 50 02/26/2018    TRIG 92 09/25/2017     Lab Results   Component Value Date    CHOLHDL 45.7 04/17/2018    CHOLHDL 43.6 02/26/2018    CHOLHDL 34.8 09/25/2017     Lab Results   Component Value Date    TSH 1.122 02/26/2018       ASSESSMENT/PLAN     Arie Salinas is a 89 y.o. male with  Past Medical History:   Diagnosis Date    Allergy     Arthritis     Atrial fibrillation 12/4/2015    Basal cell carcinoma     Chronic diastolic heart failure     Colon polyp     Coronary artery disease     Elevated PSA     Hyperlipidemia     Hypertension     Moderate mitral regurgitation     Prostate cancer     PVD (peripheral vascular disease)     Squamous cell carcinoma      Hyperlipidemia, unspecified hyperlipidemia type- advised to resume statin. Low cholesterol diet.     Coronary artery disease involving native coronary artery of native heart without angina pectoris-stable. Resume statin and continue asa     Paroxysmal atrial fibrillation- rate controlled. Cont amiodarone and eliquis.     On amiodarone therapy    Essential hypertension- at goal. Cont current meds. Low Na diet.     CKD (chronic kidney disease) stage 3, GFR 30-59 ml/min- stable. Advised BP control.     Anemia of chronic renal failure, stage 3 (moderate)- stable.     Prostate cancer- cont flomax, no s/s.     Idiopathic chronic gout of left ankle without tophus- stable. Cont allopurinol.     Screening for tuberculosis  -     Quantiferon Gold TB; Future; Expected date: 05/15/2018          Follow up with new PCP in [place].     Patient education provided from [facility]. Patient was counseled on when and how to seek emergent care.       Hamida SOW, APRN, FNP-c   Department of Internal Medicine - [facility]  2:38 PM

## 2018-05-17 LAB
MITOGEN NIL: >10 IU/ML
NIL: 0.02 IU/ML
TB ANTIGEN NIL: -0 IU/ML
TB ANTIGEN: 0.02 IU/ML
TB GOLD: NEGATIVE

## 2018-06-06 ENCOUNTER — LAB VISIT (OUTPATIENT)
Dept: LAB | Facility: HOSPITAL | Age: 83
End: 2018-06-06
Attending: INTERNAL MEDICINE
Payer: MEDICARE

## 2018-06-06 DIAGNOSIS — E78.5 HYPERLIPIDEMIA, UNSPECIFIED HYPERLIPIDEMIA TYPE: ICD-10-CM

## 2018-06-06 LAB
ALBUMIN SERPL BCP-MCNC: 3.2 G/DL
ALP SERPL-CCNC: 87 U/L
ALT SERPL W/O P-5'-P-CCNC: 16 U/L
AST SERPL-CCNC: 24 U/L
BILIRUB DIRECT SERPL-MCNC: 0.4 MG/DL
BILIRUB SERPL-MCNC: 1 MG/DL
CHOLEST SERPL-MCNC: 144 MG/DL
CHOLEST/HDLC SERPL: 2.8 {RATIO}
HDLC SERPL-MCNC: 51 MG/DL
HDLC SERPL: 35.4 %
LDLC SERPL CALC-MCNC: 79.4 MG/DL
NONHDLC SERPL-MCNC: 93 MG/DL
PROT SERPL-MCNC: 6.2 G/DL
TRIGL SERPL-MCNC: 68 MG/DL

## 2018-06-06 PROCEDURE — 80061 LIPID PANEL: CPT

## 2018-06-06 PROCEDURE — 36415 COLL VENOUS BLD VENIPUNCTURE: CPT | Mod: PO

## 2018-06-06 PROCEDURE — 80076 HEPATIC FUNCTION PANEL: CPT

## 2018-06-07 ENCOUNTER — TELEPHONE (OUTPATIENT)
Dept: CARDIOLOGY | Facility: CLINIC | Age: 83
End: 2018-06-07

## 2018-06-07 NOTE — TELEPHONE ENCOUNTER
----- Message from Kerri Gonzalez MD sent at 6/7/2018 10:30 AM CDT -----  Mr. Salinas,  Please review results of your blood work . IT looks much better.  I noticed that your Lipitor was restarted. I agree that 10 mg daily or even every other day is the way to go.  Please have it checked again once you settle in [place], probably couple of months from now.

## 2019-05-04 NOTE — TELEPHONE ENCOUNTER
----- Message from Kerri Gonzalez MD sent at 2/28/2018  3:46 PM CST -----  Please review.  We will discuss the results during your upcoming visit with me. Except for chronic kidney disease it looks good.     General

## 2019-12-28 NOTE — PROGRESS NOTES
Subjective:    Patient ID:  Arie Salinas is a 89 y.o. male who presents for follow-up of Atrial Fibrillation      HPI Comments: Arie Salinas is a 89 y.o. with a history of diastolic HF, CAD, MARTIN, s/p renal stent, and paroxsymal atrial fibrillation.     Prior visits: His history of AF dates back to December when he was admitted with acute chest pain and AF with RVR. He converted to sinus on his own prior to CARLY/eCV. He was then started on an amiodarone oral bolus. Mr. Salinas's CVA risk is elevated, specifically CHADSVASc- 4 ( Age >75, CAD/PVD/MARTIN, HTN) and he is on Eliquis for this, and for the time being he appears to be tolerating this. He feels well today in follow up, denies CP, WAHL, lightheadedness, dizziness, syncope. Currently he is on 400 mg of amiodarone daily ( 200 BID )     8/16: The patient denies interval chest pain, sob, palpitations, syncope, near syncope. He remains on amiodarone 200 mg qd as well as apixaban 5 mg bid. He has had recent increase in Cr leading to nephrology assessment. His renal imaging was without significant findings. He remains asymptomatic with regard to his AF. He is able to exert himself on a routine basis without limitation.     Interval history: No symptomatic recurrence on amiodarone 200 mg qd. The patient denies interval chest pain, sob, palpitations, syncope, near syncope.     ECHO 2016  CONCLUSIONS     1 - Normal left ventricular systolic function (EF 60-65%).     2 - Left ventricular diastolic dysfunction.     3 - Biatrial enlargement.     4 - Normal right ventricular systolic function .     5 - The estimated PA systolic pressure is 27 mmHg.     6 - Trivial aortic stenosis, ALEKSANDER = 1.63 cm2, peak velocity = 1.8 m/s, mean gradient = 8.0 mmHg.     7 - Mild to moderate mitral regurgitation.     8 - Mild tricuspid regurgitation.     9 - Mitral valve prolapse.     SPECT 2012  Impression: NORMAL MYOCARDIAL PERFUSION  1. The perfusion scan is free of evidence for  myocardial ischemia or injury.   2. There is a mild intensity fixed defect in the inferior wall of the left ventricle, secondary to diaphragm attenuation.   3. Resting wall motion is physiologic.   4. Resting LV function is normal. (normal is >= 51%)  5. The ventricular volumes are normal at rest and stress.   6. The extracardiac distribution of radioactivity is normal.   7. When compared to the previous study from 06/09/2010, there are no significant interval changes in the perfusion pattern..     Past Medical History:    Allergy                                                       Arthritis                                                     Atrial fibrillation                             12/4/2015     Basal cell carcinoma                                          Chronic diastolic heart failure                               Colon polyp                                                   Coronary artery disease                                       Elevated PSA                                                  Hyperlipidemia                                                Hypertension                                                  Moderate mitral regurgitation                                 Prostate cancer                                               PVD (peripheral vascular disease)                             Squamous cell carcinoma                                       Past Surgical History:    COLON SURGERY                                                  S/P BILATERAL RENAL STENT                        04/08/2004      Comment:PTCA    radiation                                                        Comment:prostate cancer    TONSILLECTOMY                                                  Social History    Marital status:              Spouse name: Shelia               Years of education:                 Number of children:               Occupational History  Occupation          Employer             Comment               retired                                     Social History Main Topics    Smoking status: Former Smoker                                                                Packs/day: 0.00      Years: 30.00          Types: Cigars       Quit date: 1/1/1998    Smokeless status: Never Used                        Alcohol use: Yes           3.6 oz/week       1 Shots of liquor, 5 Standard drinks or equivalent per week       Comment: 1 a day    Drug use: No              Sexual activity: Not on file          Other Topics            Concern    None on file    Social History Narrative    None on file    Review of patient's family history indicates:    Cancer                         Mother                    No Known Problems              Father                    No Known Problems              Sister                    No Known Problems              Maternal Grandmother      No Known Problems              Maternal Grandfather      No Known Problems              Paternal Grandmother      No Known Problems              Paternal Grandfather      No Known Problems              Brother                   No Known Problems              Maternal Aunt             No Known Problems              Maternal Uncle            No Known Problems              Paternal Aunt             No Known Problems              Paternal Uncle            Heart disease                  Neg Hx                    Diabetes                       Neg Hx                    Colon polyps                   Neg Hx                    Melanoma                       Neg Hx                    Eczema                         Neg Hx                    Psoriasis                      Neg Hx                    Anemia                         Neg Hx                    Arrhythmia                     Neg Hx                    Asthma                         Neg Hx                    Clotting disorder              Neg Hx                    Fainting                        Neg Hx                    Heart attack                   Neg Hx                    Heart failure                  Neg Hx                    Hyperlipidemia                 Neg Hx                    Hypertension                   Neg Hx                    Stroke                         Neg Hx                    Atrial Septal Defect           Neg Hx                      Atrial Fibrillation   Symptoms include weakness. Symptoms are negative for chest pain, dizziness, palpitations, shortness of breath and syncope. Past medical history includes atrial fibrillation.       Review of Systems   Constitution: Positive for weakness and malaise/fatigue. Negative for diaphoresis, weight gain and weight loss.   HENT: Negative.    Eyes: Negative.    Cardiovascular: Negative for chest pain, claudication, cyanosis, dyspnea on exertion, irregular heartbeat, near-syncope, orthopnea, palpitations, paroxysmal nocturnal dyspnea and syncope.   Respiratory: Negative for cough, shortness of breath, sleep disturbances due to breathing, sputum production and wheezing.    Endocrine: Negative for cold intolerance and heat intolerance.   Skin: Negative.    Musculoskeletal: Negative.    Gastrointestinal: Negative.  Negative for jaundice, nausea and vomiting.   Neurological: Negative for dizziness, focal weakness, light-headedness and numbness.   Psychiatric/Behavioral: Negative for altered mental status.        Objective:    Physical Exam   Constitutional: He is oriented to person, place, and time. He appears well-developed and well-nourished.   HENT:   Head: Normocephalic and atraumatic.   Mouth/Throat: No oropharyngeal exudate.   Eyes: Right eye exhibits no discharge. No scleral icterus.   Neck: No JVD present. No thyromegaly present.   Cardiovascular: Normal rate, regular rhythm, normal heart sounds and intact distal pulses.  Exam reveals no gallop and no friction rub.    No murmur heard.  Pulmonary/Chest: Effort normal and breath sounds  normal. No stridor. No respiratory distress. He has no wheezes. He has no rales. He exhibits no tenderness.   Abdominal: Bowel sounds are normal. He exhibits no distension. There is no tenderness.   Musculoskeletal: He exhibits no edema.   Neurological: He is alert and oriented to person, place, and time.   Skin: Skin is warm. No rash noted. No erythema. No pallor.   Psychiatric: He has a normal mood and affect. His behavior is normal. Judgment and thought content normal.   Vitals reviewed.    ECG: NSR , QRS 88, QTc nl        Assessment:       1. Paroxysmal atrial fibrillation    2. On amiodarone therapy    3. Essential hypertension    4. Non-rheumatic mitral regurgitation    5. PAD (peripheral artery disease)    6. PVC's (premature ventricular contractions)         Plan:       89 yoM HTN, PVD here for AF management. No symptomatic recurrence on amiodarone. Will get remaining amiodarone therapy (TSH & PFTs), due to see ophthalmology in the next month. Normal LFTs 2 months ago. RTC 1y             Yes